# Patient Record
Sex: MALE | Race: WHITE | NOT HISPANIC OR LATINO | Employment: FULL TIME | URBAN - METROPOLITAN AREA
[De-identification: names, ages, dates, MRNs, and addresses within clinical notes are randomized per-mention and may not be internally consistent; named-entity substitution may affect disease eponyms.]

---

## 2017-02-14 DIAGNOSIS — E78.2 MIXED HYPERLIPIDEMIA: ICD-10-CM

## 2017-02-14 RX ORDER — ATORVASTATIN CALCIUM 20 MG/1
20 TABLET, FILM COATED ORAL DAILY
Qty: 90 TABLET | Refills: 0 | Status: SHIPPED | OUTPATIENT
Start: 2017-02-14 | End: 2017-05-19

## 2017-02-14 NOTE — TELEPHONE ENCOUNTER
Atorvastatin 20mg     Last Written Prescription Date: 2/9/16  Last Fill Quantity: 90, # refills: 3  Last Office Visit with G, P or [facility] prescribing provider: 10/24/16  Last date of pharmacy refill:  11/7/16       Lab Results   Component Value Date    CHOL 142 05/16/2016     Lab Results   Component Value Date    HDL 51 05/16/2016     Lab Results   Component Value Date    LDL 78 05/16/2016     Lab Results   Component Value Date    TRIG 63 05/16/2016     No results found for: MIKE

## 2017-05-19 DIAGNOSIS — E78.2 MIXED HYPERLIPIDEMIA: ICD-10-CM

## 2017-05-19 DIAGNOSIS — Z13.1 SCREENING FOR DIABETES MELLITUS: Primary | ICD-10-CM

## 2017-05-19 RX ORDER — ATORVASTATIN CALCIUM 20 MG/1
TABLET, FILM COATED ORAL
Qty: 30 TABLET | Refills: 0 | Status: SHIPPED | OUTPATIENT
Start: 2017-05-19 | End: 2017-06-26

## 2017-05-19 NOTE — TELEPHONE ENCOUNTER
Medication is being filled for 1 time refill only due to: Last lipid 5-16-16.  Patient needs labs fasting lipid.. Future labs ordered Lipid panel ordered. .  Routing to provider to review for any other labs needed.  Ashley

## 2017-05-19 NOTE — LETTER
[facility]  [address]  [place] [address]  [phone]        May 24, 2018    James Luis  [address] [address]              Dear James Luis    This is to remind you that your fasting lab is due.    You may call our office at [phone] to schedule an appointment.    Please disregard this notice if you have already had your labs drawn or made an appointment.        Sincerely,        Paula Fowler MD

## 2017-05-19 NOTE — TELEPHONE ENCOUNTER
atorvastatin (LIPITOR) 20 MG tablet     Last Written Prescription Date: 2/14/17  Last Fill Quantity: 90, # refills: 0  Last Office Visit with G, P or [facility] prescribing provider: 10/24/16       Lab Results   Component Value Date    CHOL 142 05/16/2016     Lab Results   Component Value Date    HDL 51 05/16/2016     Lab Results   Component Value Date    LDL 78 05/16/2016     Lab Results   Component Value Date    TRIG 63 05/16/2016     No results found for: CHOLEMMA

## 2017-06-15 ENCOUNTER — OFFICE VISIT - HEALTHEAST (OUTPATIENT)
Dept: FAMILY MEDICINE | Facility: CLINIC | Age: 47
End: 2017-06-15

## 2017-06-15 DIAGNOSIS — E78.00 HYPERCHOLESTEREMIA: ICD-10-CM

## 2017-06-15 DIAGNOSIS — Z00.00 ROUTINE GENERAL MEDICAL EXAMINATION AT A HEALTH CARE FACILITY: ICD-10-CM

## 2017-06-15 LAB
CHOLEST SERPL-MCNC: 140 MG/DL
FASTING STATUS PATIENT QL REPORTED: YES
HDLC SERPL-MCNC: 43 MG/DL
LDLC SERPL CALC-MCNC: 84 MG/DL
TRIGL SERPL-MCNC: 66 MG/DL

## 2017-06-15 ASSESSMENT — MIFFLIN-ST. JEOR: SCORE: 1403.98

## 2017-06-26 DIAGNOSIS — E78.2 MIXED HYPERLIPIDEMIA: ICD-10-CM

## 2017-06-26 NOTE — TELEPHONE ENCOUNTER
ATORVASTATIN 20 MG TABLET       Last Written Prescription Date: 5/19/17  Last Fill Quantity: 30, # refills: 0  Last Office Visit with [facility], P or [facility] prescribing provider: 10/24/16       Lab Results   Component Value Date    CHOL 142 05/16/2016     Lab Results   Component Value Date    HDL 51 05/16/2016     Lab Results   Component Value Date    LDL 78 05/16/2016     Lab Results   Component Value Date    TRIG 63 05/16/2016     No results found for: CHOLEMMA

## 2017-06-26 NOTE — LETTER
[facility]  [address]  [place] [facility] [address]  [phone]        July 2, 2018    James Luis  [address] [address]              Dear James Luis    This is to remind you that your lab is due.    You may call our office at [phone] to schedule an appointment.    Please disregard this notice if you have already had your labs drawn or made an appointment.        Sincerely,        Paula Fowler MD

## 2017-06-27 RX ORDER — ATORVASTATIN CALCIUM 20 MG/1
TABLET, FILM COATED ORAL
Qty: 30 TABLET | Refills: 0 | Status: SHIPPED | OUTPATIENT
Start: 2017-06-27 | End: 2017-08-02

## 2017-06-27 NOTE — TELEPHONE ENCOUNTER
Medication is being filled for 1 time refill only due to:  Patient needs labs lipids,cmp. Future labs ordered yes.   Omer Klein RN

## 2017-08-02 DIAGNOSIS — E78.2 MIXED HYPERLIPIDEMIA: ICD-10-CM

## 2017-08-02 NOTE — TELEPHONE ENCOUNTER
atorvastatin      Last Written Prescription Date: 6/27/17  Last Fill Quantity: 30, # refills: 0  Last Office Visit with [facility], P or [facility] prescribing provider: 10/24/16       Lab Results   Component Value Date    CHOL 142 05/16/2016     Lab Results   Component Value Date    HDL 51 05/16/2016     Lab Results   Component Value Date    LDL 78 05/16/2016     Lab Results   Component Value Date    TRIG 63 05/16/2016     No results found for: CHOLFUNMIO

## 2017-08-04 RX ORDER — ATORVASTATIN CALCIUM 20 MG/1
20 TABLET, FILM COATED ORAL DAILY
Qty: 30 TABLET | Refills: 0 | Status: SHIPPED | OUTPATIENT
Start: 2017-08-04

## 2017-08-04 NOTE — TELEPHONE ENCOUNTER
Medication is being filled for 1 time refill only due to:  Patient needs labs before refill.  Chapis Robles RN

## 2017-08-21 ENCOUNTER — COMMUNICATION - HEALTHEAST (OUTPATIENT)
Dept: FAMILY MEDICINE | Facility: CLINIC | Age: 47
End: 2017-08-21

## 2017-08-21 DIAGNOSIS — E78.5 HYPERLIPIDEMIA: ICD-10-CM

## 2017-12-21 ENCOUNTER — RECORDS - HEALTHEAST (OUTPATIENT)
Dept: GENERAL RADIOLOGY | Facility: CLINIC | Age: 47
End: 2017-12-21

## 2017-12-21 ENCOUNTER — OFFICE VISIT - HEALTHEAST (OUTPATIENT)
Dept: FAMILY MEDICINE | Facility: CLINIC | Age: 47
End: 2017-12-21

## 2017-12-21 DIAGNOSIS — R74.8 ELEVATED LIVER ENZYMES: ICD-10-CM

## 2017-12-21 DIAGNOSIS — M25.551 PAIN IN RIGHT HIP: ICD-10-CM

## 2017-12-21 DIAGNOSIS — M25.551 RIGHT HIP PAIN: ICD-10-CM

## 2017-12-21 ASSESSMENT — MIFFLIN-ST. JEOR: SCORE: 1431.19

## 2017-12-26 ENCOUNTER — COMMUNICATION - HEALTHEAST (OUTPATIENT)
Dept: FAMILY MEDICINE | Facility: CLINIC | Age: 47
End: 2017-12-26

## 2018-01-02 ENCOUNTER — RECORDS - HEALTHEAST (OUTPATIENT)
Dept: ADMINISTRATIVE | Facility: OTHER | Age: 48
End: 2018-01-02

## 2018-03-07 ENCOUNTER — COMMUNICATION - HEALTHEAST (OUTPATIENT)
Dept: FAMILY MEDICINE | Facility: CLINIC | Age: 48
End: 2018-03-07

## 2018-03-07 DIAGNOSIS — E78.5 HYPERLIPIDEMIA: ICD-10-CM

## 2018-03-07 DIAGNOSIS — R73.09 ELEVATED GLUCOSE: ICD-10-CM

## 2018-03-12 ENCOUNTER — AMBULATORY - HEALTHEAST (OUTPATIENT)
Dept: LAB | Facility: CLINIC | Age: 48
End: 2018-03-12

## 2018-03-12 DIAGNOSIS — R73.09 ELEVATED GLUCOSE: ICD-10-CM

## 2018-03-12 DIAGNOSIS — E78.5 HYPERLIPIDEMIA: ICD-10-CM

## 2018-03-12 LAB
ALBUMIN SERPL-MCNC: 3.8 G/DL (ref 3.5–5)
ALP SERPL-CCNC: 59 U/L (ref 45–120)
ALT SERPL W P-5'-P-CCNC: 25 U/L (ref 0–45)
ANION GAP SERPL CALCULATED.3IONS-SCNC: 7 MMOL/L (ref 5–18)
AST SERPL W P-5'-P-CCNC: 19 U/L (ref 0–40)
BILIRUB DIRECT SERPL-MCNC: 0.2 MG/DL
BILIRUB SERPL-MCNC: 0.5 MG/DL (ref 0–1)
BUN SERPL-MCNC: 21 MG/DL (ref 8–22)
CALCIUM SERPL-MCNC: 9.4 MG/DL (ref 8.5–10.5)
CHLORIDE BLD-SCNC: 106 MMOL/L (ref 98–107)
CHOLEST SERPL-MCNC: 147 MG/DL
CO2 SERPL-SCNC: 27 MMOL/L (ref 22–31)
CREAT SERPL-MCNC: 1.05 MG/DL (ref 0.7–1.3)
FASTING STATUS PATIENT QL REPORTED: YES
GFR SERPL CREATININE-BSD FRML MDRD: >60 ML/MIN/1.73M2
GLUCOSE BLD-MCNC: 92 MG/DL (ref 70–125)
HBA1C MFR BLD: 5.6 % (ref 3.5–6)
HDLC SERPL-MCNC: 41 MG/DL
LDLC SERPL CALC-MCNC: 85 MG/DL
POTASSIUM BLD-SCNC: 4.3 MMOL/L (ref 3.5–5)
PROT SERPL-MCNC: 6.6 G/DL (ref 6–8)
SODIUM SERPL-SCNC: 140 MMOL/L (ref 136–145)
TRIGL SERPL-MCNC: 106 MG/DL

## 2018-03-30 ENCOUNTER — OFFICE VISIT - HEALTHEAST (OUTPATIENT)
Dept: FAMILY MEDICINE | Facility: CLINIC | Age: 48
End: 2018-03-30

## 2018-03-30 DIAGNOSIS — J02.9 SORE THROAT: ICD-10-CM

## 2018-03-30 LAB — DEPRECATED S PYO AG THROAT QL EIA: NORMAL

## 2018-03-31 LAB — GROUP A STREP BY PCR: NORMAL

## 2018-04-02 ENCOUNTER — COMMUNICATION - HEALTHEAST (OUTPATIENT)
Dept: FAMILY MEDICINE | Facility: CLINIC | Age: 48
End: 2018-04-02

## 2018-04-02 ENCOUNTER — OFFICE VISIT - HEALTHEAST (OUTPATIENT)
Dept: FAMILY MEDICINE | Facility: CLINIC | Age: 48
End: 2018-04-02

## 2018-04-02 DIAGNOSIS — J02.9 SORE THROAT: ICD-10-CM

## 2018-04-02 DIAGNOSIS — R50.9 FEVER: ICD-10-CM

## 2018-04-02 DIAGNOSIS — R05.9 COUGH: ICD-10-CM

## 2018-04-02 LAB
FLUAV AG SPEC QL IA: NORMAL
FLUBV AG SPEC QL IA: NORMAL

## 2018-04-02 ASSESSMENT — MIFFLIN-ST. JEOR: SCORE: 1426.66

## 2018-04-05 ENCOUNTER — COMMUNICATION - HEALTHEAST (OUTPATIENT)
Dept: FAMILY MEDICINE | Facility: CLINIC | Age: 48
End: 2018-04-05

## 2018-04-05 DIAGNOSIS — R93.89 ABNORMAL X-RAY: ICD-10-CM

## 2018-04-09 ENCOUNTER — HOSPITAL ENCOUNTER (OUTPATIENT)
Dept: CT IMAGING | Facility: HOSPITAL | Age: 48
Discharge: HOME | End: 2018-04-09
Attending: FAMILY MEDICINE

## 2018-04-09 DIAGNOSIS — R93.89 ABNORMAL X-RAY: ICD-10-CM

## 2018-04-11 ENCOUNTER — AMBULATORY - HEALTHEAST (OUTPATIENT)
Dept: FAMILY MEDICINE | Facility: CLINIC | Age: 48
End: 2018-04-11

## 2018-04-12 ENCOUNTER — COMMUNICATION - HEALTHEAST (OUTPATIENT)
Dept: FAMILY MEDICINE | Facility: CLINIC | Age: 48
End: 2018-04-12

## 2018-04-18 ENCOUNTER — TELEPHONE (OUTPATIENT)
Dept: OTHER | Facility: CLINIC | Age: 48
End: 2018-04-18

## 2018-04-18 NOTE — TELEPHONE ENCOUNTER
4/18/2018    Call Regarding [employer]boarding [employer] Other    Attempt 1    Message on voicemail     Comments: spouse, 2 child dep      Outreach   FLOYD

## 2018-04-20 ENCOUNTER — OFFICE VISIT - HEALTHEAST (OUTPATIENT)
Dept: FAMILY MEDICINE | Facility: CLINIC | Age: 48
End: 2018-04-20

## 2018-04-20 DIAGNOSIS — R05.9 COUGH: ICD-10-CM

## 2018-04-20 DIAGNOSIS — Z87.891 FORMER TOBACCO USE: ICD-10-CM

## 2018-04-20 DIAGNOSIS — J18.9 PNEUMONIA: ICD-10-CM

## 2018-04-20 ASSESSMENT — MIFFLIN-ST. JEOR: SCORE: 1422.57

## 2018-04-23 LAB
QTF INTERPRETATION: NORMAL
QTF MITOGEN - NIL: >10 IU/ML
QTF NIL: 0.04 IU/ML
QTF RESULT: NEGATIVE
QTF TB ANTIGEN - NIL: 0 IU/ML

## 2018-05-23 NOTE — TELEPHONE ENCOUNTER
5/23/2018    Call Regarding Onboarding [employer] OTHER    Attempt 2    Message on voicemail     Comments: spouse, 2 child dep      Outreach   FLOYD

## 2018-06-06 NOTE — TELEPHONE ENCOUNTER
6/6/2018    Call Regarding Onboarding [employer] other    Attempt 3    Message on voicemail    Comments: 1 spouse, 2 child dep       Outreach   Caitlyn Pike

## 2018-06-29 ENCOUNTER — TELEPHONE (OUTPATIENT)
Dept: LAB | Facility: CLINIC | Age: 48
End: 2018-06-29

## 2018-08-07 ENCOUNTER — TELEPHONE (OUTPATIENT)
Dept: LAB | Facility: CLINIC | Age: 48
End: 2018-08-07

## 2018-08-11 ENCOUNTER — COMMUNICATION - HEALTHEAST (OUTPATIENT)
Dept: FAMILY MEDICINE | Facility: CLINIC | Age: 48
End: 2018-08-11

## 2018-08-11 DIAGNOSIS — E78.5 HYPERLIPIDEMIA: ICD-10-CM

## 2018-09-04 ENCOUNTER — COMMUNICATION - HEALTHEAST (OUTPATIENT)
Dept: FAMILY MEDICINE | Facility: CLINIC | Age: 48
End: 2018-09-04

## 2018-09-04 DIAGNOSIS — R93.89 ABNORMAL CT SCAN, CHEST: ICD-10-CM

## 2018-09-10 ENCOUNTER — HOSPITAL ENCOUNTER (OUTPATIENT)
Dept: CT IMAGING | Facility: HOSPITAL | Age: 48
Discharge: HOME | End: 2018-09-10
Attending: FAMILY MEDICINE

## 2018-09-10 DIAGNOSIS — R93.89 ABNORMAL CT SCAN, CHEST: ICD-10-CM

## 2018-09-11 ENCOUNTER — COMMUNICATION - HEALTHEAST (OUTPATIENT)
Dept: FAMILY MEDICINE | Facility: CLINIC | Age: 48
End: 2018-09-11

## 2019-01-15 ENCOUNTER — COMMUNICATION - HEALTHEAST (OUTPATIENT)
Dept: TELEHEALTH | Facility: CLINIC | Age: 49
End: 2019-01-15

## 2019-03-07 ENCOUNTER — OFFICE VISIT - HEALTHEAST (OUTPATIENT)
Dept: FAMILY MEDICINE | Facility: CLINIC | Age: 49
End: 2019-03-07

## 2019-03-07 DIAGNOSIS — Z12.11 SCREEN FOR COLON CANCER: ICD-10-CM

## 2019-03-07 DIAGNOSIS — Z79.899 MEDICATION MANAGEMENT: ICD-10-CM

## 2019-03-07 DIAGNOSIS — M54.50 ACUTE MIDLINE LOW BACK PAIN WITHOUT SCIATICA: ICD-10-CM

## 2019-03-07 DIAGNOSIS — E78.00 HYPERCHOLESTEREMIA: ICD-10-CM

## 2019-03-07 LAB
CHOLEST SERPL-MCNC: 141 MG/DL
FASTING STATUS PATIENT QL REPORTED: YES
HDLC SERPL-MCNC: 44 MG/DL
LDLC SERPL CALC-MCNC: 82 MG/DL
TRIGL SERPL-MCNC: 73 MG/DL

## 2019-03-14 ENCOUNTER — OFFICE VISIT - HEALTHEAST (OUTPATIENT)
Dept: FAMILY MEDICINE | Facility: CLINIC | Age: 49
End: 2019-03-14

## 2019-03-14 DIAGNOSIS — L03.011 FELON OF FINGER OF RIGHT HAND: ICD-10-CM

## 2019-03-18 ENCOUNTER — RECORDS - HEALTHEAST (OUTPATIENT)
Dept: ADMINISTRATIVE | Facility: OTHER | Age: 49
End: 2019-03-18

## 2019-03-18 ENCOUNTER — RECORDS - HEALTHEAST (OUTPATIENT)
Dept: LAB | Facility: CLINIC | Age: 49
End: 2019-03-18

## 2019-03-21 ENCOUNTER — RECORDS - HEALTHEAST (OUTPATIENT)
Dept: ADMINISTRATIVE | Facility: OTHER | Age: 49
End: 2019-03-21

## 2019-03-21 LAB
BACTERIA SPEC CULT: ABNORMAL
BACTERIA SPEC CULT: NORMAL
GRAM STAIN RESULT: ABNORMAL
GRAM STAIN RESULT: ABNORMAL

## 2019-05-14 ENCOUNTER — COMMUNICATION - HEALTHEAST (OUTPATIENT)
Dept: FAMILY MEDICINE | Facility: CLINIC | Age: 49
End: 2019-05-14

## 2019-05-14 DIAGNOSIS — E78.5 HYPERLIPIDEMIA: ICD-10-CM

## 2019-06-07 ENCOUNTER — OFFICE VISIT - HEALTHEAST (OUTPATIENT)
Dept: FAMILY MEDICINE | Facility: CLINIC | Age: 49
End: 2019-06-07

## 2019-06-07 DIAGNOSIS — J18.9 PNEUMONIA DUE TO INFECTIOUS ORGANISM, UNSPECIFIED LATERALITY, UNSPECIFIED PART OF LUNG: ICD-10-CM

## 2019-06-07 DIAGNOSIS — H91.90 HEARING LOSS, UNSPECIFIED HEARING LOSS TYPE, UNSPECIFIED LATERALITY: ICD-10-CM

## 2019-06-17 ENCOUNTER — RECORDS - HEALTHEAST (OUTPATIENT)
Dept: ADMINISTRATIVE | Facility: OTHER | Age: 49
End: 2019-06-17

## 2019-06-20 ENCOUNTER — OFFICE VISIT - HEALTHEAST (OUTPATIENT)
Dept: AUDIOLOGY | Facility: CLINIC | Age: 49
End: 2019-06-20

## 2019-06-20 DIAGNOSIS — H93.13 TINNITUS OF BOTH EARS: ICD-10-CM

## 2019-06-20 DIAGNOSIS — H90.41 SENSORINEURAL HEARING LOSS (SNHL) OF RIGHT EAR WITH UNRESTRICTED HEARING OF LEFT EAR: ICD-10-CM

## 2019-06-21 ENCOUNTER — COMMUNICATION - HEALTHEAST (OUTPATIENT)
Dept: PULMONOLOGY | Facility: OTHER | Age: 49
End: 2019-06-21

## 2019-08-19 ENCOUNTER — OFFICE VISIT - HEALTHEAST (OUTPATIENT)
Dept: PULMONOLOGY | Facility: OTHER | Age: 49
End: 2019-08-19

## 2019-08-19 DIAGNOSIS — J18.9 PNEUMONIA OF BOTH UPPER LOBES DUE TO INFECTIOUS ORGANISM: ICD-10-CM

## 2019-08-19 LAB
IGA SERPL-MCNC: 1031 MG/DL
IGA SERPL-MCNC: 245 MG/DL (ref 65–400)
IGM SERPL-MCNC: 116 MG/DL (ref 60–280)

## 2019-08-19 ASSESSMENT — MIFFLIN-ST. JEOR: SCORE: 1442.99

## 2019-09-04 ENCOUNTER — RECORDS - HEALTHEAST (OUTPATIENT)
Dept: PULMONOLOGY | Facility: OTHER | Age: 49
End: 2019-09-04

## 2019-09-04 ENCOUNTER — RECORDS - HEALTHEAST (OUTPATIENT)
Dept: ADMINISTRATIVE | Facility: OTHER | Age: 49
End: 2019-09-04

## 2019-09-04 DIAGNOSIS — J18.1 LOBAR PNEUMONIA, UNSPECIFIED ORGANISM (H): ICD-10-CM

## 2019-09-04 LAB — HGB BLD-MCNC: 13.6 G/DL

## 2019-10-07 ENCOUNTER — OFFICE VISIT - HEALTHEAST (OUTPATIENT)
Dept: PULMONOLOGY | Facility: OTHER | Age: 49
End: 2019-10-07

## 2019-10-07 DIAGNOSIS — J18.9 PNEUMONIA OF BOTH UPPER LOBES DUE TO INFECTIOUS ORGANISM: ICD-10-CM

## 2019-11-12 ENCOUNTER — OFFICE VISIT - HEALTHEAST (OUTPATIENT)
Dept: PULMONOLOGY | Facility: OTHER | Age: 49
End: 2019-11-12

## 2019-11-12 DIAGNOSIS — J98.01 BRONCHOSPASM: ICD-10-CM

## 2019-11-12 ASSESSMENT — MIFFLIN-ST. JEOR: SCORE: 1458.41

## 2019-11-23 ENCOUNTER — APPOINTMENT (OUTPATIENT)
Dept: GENERAL RADIOLOGY | Facility: CLINIC | Age: 49
End: 2019-11-23
Attending: PHYSICIAN ASSISTANT
Payer: COMMERCIAL

## 2019-11-23 ENCOUNTER — RECORDS - HEALTHEAST (OUTPATIENT)
Dept: ADMINISTRATIVE | Facility: OTHER | Age: 49
End: 2019-11-23

## 2019-11-23 ENCOUNTER — HOSPITAL ENCOUNTER (EMERGENCY)
Facility: CLINIC | Age: 49
Discharge: HOME | End: 2019-11-23
Attending: PHYSICIAN ASSISTANT | Admitting: PHYSICIAN ASSISTANT
Payer: COMMERCIAL

## 2019-11-23 VITALS
HEART RATE: 61 BPM | SYSTOLIC BLOOD PRESSURE: 136 MMHG | BODY MASS INDEX: 24.5 KG/M2 | OXYGEN SATURATION: 95 % | TEMPERATURE: 98.7 F | DIASTOLIC BLOOD PRESSURE: 81 MMHG | RESPIRATION RATE: 20 BRPM | WEIGHT: 145 LBS

## 2019-11-23 DIAGNOSIS — J98.01 BRONCHOSPASM: ICD-10-CM

## 2019-11-23 PROCEDURE — 71046 X-RAY EXAM CHEST 2 VIEWS: CPT

## 2019-11-23 PROCEDURE — G0463 HOSPITAL OUTPT CLINIC VISIT: HCPCS | Mod: 25 | Performed by: PHYSICIAN ASSISTANT

## 2019-11-23 PROCEDURE — 99214 OFFICE O/P EST MOD 30 MIN: CPT | Mod: Z6 | Performed by: PHYSICIAN ASSISTANT

## 2019-11-23 RX ORDER — ALBUTEROL SULFATE 0.83 MG/ML
2.5 SOLUTION RESPIRATORY (INHALATION) EVERY 4 HOURS PRN
Qty: 1 BOX | Refills: 0 | Status: SHIPPED | OUTPATIENT
Start: 2019-11-23 | End: 2019-12-23

## 2019-11-23 RX ORDER — PREDNISONE 20 MG/1
TABLET ORAL
Qty: 10 TABLET | Refills: 0 | Status: SHIPPED | OUTPATIENT
Start: 2019-11-23

## 2019-11-23 NOTE — ED PROVIDER NOTES
History     Chief Complaint   Patient presents with     Cough     x 6 weeks. dry     HPI  James Luis is a 49 year old male who presents to the urgent care with concern of a cough which is been present for the last 4 to 6 weeks.  Patient reports that h is got started shortly after he cleaned out the garage and some wood particles flew into his face, since then he complains of dry cough, shortness of breath, wheezing.  He believes that his symptoms were initially improving however the last several days have began to worsen and has had sore throat that he attributes to his cough.  He denies any fever, chills, myalgias.  He has not had any nausea, vomiting, diarrhea or abdominal pain.  He was evaluated by his pulmonologist several weeks ago and was told that symptoms are likely due to bronchospasm and would take up to another month to resolve.  No imaging was done at that time.  He reports that several months prior to that he was treated with antibiotics for pneumonia and did have confirmed clearance of infection by CT on 9/10/19.  He has been using albuterol inhaler and 2 puffs approximately every 6 hours however he for the last couple days he has increased to 3 puffs every 4.  He denies any prior history of asthma, COPD.  He is a former smoker who quit approximately 30 years ago.     Allergies:  No Known Allergies    Problem List:    Patient Active Problem List    Diagnosis Date Noted     Mixed hyperlipidemia 02/09/2016     Priority: Medium     CARDIOVASCULAR SCREENING; LDL GOAL LESS THAN 130 10/31/2010     Priority: Medium     Rosacea 04/12/2010     Priority: Medium      Past Medical History:    Past Medical History:   Diagnosis Date     NO ACTIVE PROBLEMS      Past Surgical History:    Past Surgical History:   Procedure Laterality Date     C ROOT CANAL THERAPY 2 CANALS  2016     Family History:    Family History   Problem Relation Age of Onset     Arthritis Mother      Coronary Artery Disease Father       Hypertension Father      Diabetes Father      Obesity Father      Other - See Comments Father         eye     Coronary Artery Disease Brother      Social History:  Marital Status:   [2]  Social History     Tobacco Use     Smoking status: Former Smoker     Smokeless tobacco: Former User     Tobacco comment: quit 1999   Substance Use Topics     Alcohol use: No     Alcohol/week: 0.0 standard drinks     Drug use: No        Medications:    aspirin 81 MG EC tablet  atorvastatin (LIPITOR) 20 MG tablet      Review of Systems  CONSTITUTIONAL:NEGATIVE for fever, chills, change in weight  INTEGUMENTARY/SKIN: NEGATIVE for worrisome rashes, moles or lesions  EYES: NEGATIVE for vision changes or irritation  ENT/MOUTH: POSITIVE for sore throat, minimal nasal congestion and NEGATIVE for ear pain   RESP:POSITIVE for cough, shortness of breath, wheezing   GI: NEGATIVE for nausea, vomiting, diarrhea, abdominal pain   Physical Exam   BP: 136/81  Pulse: 61  Temp: 98.7  F (37.1  C)  Resp: 20  Weight: 65.8 kg (145 lb)  SpO2: 95 %  Physical Exam  GENERAL APPEARANCE: healthy, alert and no distress  EYES: EOMI,  PERRL, conjunctiva clear  HENT: ear canals and TM's normal.  Nasal mucosa moist.  Posterior fossa is nonerythematous without exudate  NECK: supple, nontender, no lymphadenopathy  RESP: lungs clear to auscultation - no rales, rhonchi or wheezes, frequent cough with inhalation, exhalation  CV: regular rates and rhythm, normal S1 S2, no murmur noted  SKIN: no suspicious lesions or rashes  ED Course        Procedures        Critical Care time:  none        Results for orders placed or performed during the hospital encounter of 11/23/19 (from the past 24 hour(s))   Chest XR,  PA & LAT    Narrative    XR CHEST 2 VW   11/23/2019 3:01 PM     HISTORY:  cough      Impression    IMPRESSION:  Negative exam.    CARLTON POLLARD MD       Medications - No data to display    Assessments & Plan (with Medical Decision Making)     I have  reviewed the nursing notes.    I have reviewed the findings, diagnosis, plan and need for follow up with the patient.       Discharge Medication List as of 11/23/2019  3:42 PM      START taking these medications    Details   albuterol (PROVENTIL) (2.5 MG/3ML) 0.083% neb solution Take 1 vial (2.5 mg) by nebulization every 4 hours as needed for shortness of breath / dyspnea or wheezing, Disp-1 Box, R-0, E-Prescribe      predniSONE (DELTASONE) 20 MG tablet Take two tablets (= 40mg) each day for 5 (five) days, Disp-10 tablet, R-0, E-Prescribe      Respiratory Therapy Supplies (NEBULIZER) ARMANDO Take 1 Device by mouth once for 1 dose, Disp-1 each, R-0, E-PrescribeInclude tubing and mask for age please.             Final diagnoses:   Bronchospasm     49-year-old male presents to the urgent care concern over 4 to 6-week history of cough after he was sleeping in the garage and not avoid shavings which his face.  He had stable vital signs upon arrival.  Physical exam findings as described above included lungs which are clear to auscultation no wheezing, rales or rhonchi however there was a frequent cough with inhalation, exhalation.  Patient had chest x-ray which was negative for acute infiltrate, pneumothorax, pleural effusion or change in cardiopulmonary vasculature.  We agree with pulmonology reported that findings are likely secondary to bronchospasm from aerosolized particles.  Differential also include a viral bronchitis.  I do not suspect pertussis, undiagnosed asthma,  PE and will defer further evaluation.  He was discharged home stable with albuterol nebs as I think is really more effective as he coughs every time he attempts to take inhaler.  He was also given prescription for prednisone 40 mg daily for 5 days.  He was instructed to follow-up with primary care provider or his pulmonologist if no improvement within the next 3 to 5 days.  Worrisome reasons to return to the ER/UC sooner discussed.     Disclaimer: This  note consists of symbols derived from keyboarding, dictation, and/or voice recognition software. As a result, there may be errors in the script that have gone undetected.  Please consider this when interpreting information found in the chart.      11/23/2019   [facility] EMERGENCY DEPARTMENT     Etta Dawson PA-C  11/24/19 1142

## 2019-11-24 ENCOUNTER — RECORDS - HEALTHEAST (OUTPATIENT)
Dept: ADMINISTRATIVE | Facility: OTHER | Age: 49
End: 2019-11-24

## 2019-11-24 ENCOUNTER — COMMUNICATION - HEALTHEAST (OUTPATIENT)
Dept: PULMONOLOGY | Facility: OTHER | Age: 49
End: 2019-11-24

## 2019-11-29 ENCOUNTER — AMBULATORY - HEALTHEAST (OUTPATIENT)
Dept: PULMONOLOGY | Facility: OTHER | Age: 49
End: 2019-11-29

## 2019-11-29 ENCOUNTER — OFFICE VISIT - HEALTHEAST (OUTPATIENT)
Dept: FAMILY MEDICINE | Facility: CLINIC | Age: 49
End: 2019-11-29

## 2019-11-29 DIAGNOSIS — J98.01 BRONCHOSPASM: ICD-10-CM

## 2019-11-29 DIAGNOSIS — R05.9 COUGH: ICD-10-CM

## 2019-12-02 ENCOUNTER — OFFICE VISIT - HEALTHEAST (OUTPATIENT)
Dept: PULMONOLOGY | Facility: OTHER | Age: 49
End: 2019-12-02

## 2019-12-02 DIAGNOSIS — J40 BRONCHITIS: ICD-10-CM

## 2020-02-04 ENCOUNTER — COMMUNICATION - HEALTHEAST (OUTPATIENT)
Dept: FAMILY MEDICINE | Facility: CLINIC | Age: 50
End: 2020-02-04

## 2020-02-04 DIAGNOSIS — E78.5 HYPERLIPIDEMIA: ICD-10-CM

## 2020-03-01 ENCOUNTER — HEALTH MAINTENANCE LETTER (OUTPATIENT)
Age: 50
End: 2020-03-01

## 2020-03-27 ENCOUNTER — COMMUNICATION - HEALTHEAST (OUTPATIENT)
Dept: FAMILY MEDICINE | Facility: CLINIC | Age: 50
End: 2020-03-27

## 2020-09-12 ENCOUNTER — COMMUNICATION - HEALTHEAST (OUTPATIENT)
Dept: FAMILY MEDICINE | Facility: CLINIC | Age: 50
End: 2020-09-12

## 2020-09-12 DIAGNOSIS — E78.5 HYPERLIPIDEMIA: ICD-10-CM

## 2020-12-14 ENCOUNTER — HEALTH MAINTENANCE LETTER (OUTPATIENT)
Age: 50
End: 2020-12-14

## 2020-12-17 ENCOUNTER — COMMUNICATION - HEALTHEAST (OUTPATIENT)
Dept: FAMILY MEDICINE | Facility: CLINIC | Age: 50
End: 2020-12-17

## 2020-12-17 DIAGNOSIS — E78.5 HYPERLIPIDEMIA: ICD-10-CM

## 2021-01-05 ENCOUNTER — COMMUNICATION - HEALTHEAST (OUTPATIENT)
Dept: FAMILY MEDICINE | Facility: CLINIC | Age: 51
End: 2021-01-05

## 2021-01-05 DIAGNOSIS — E78.5 HYPERLIPIDEMIA: ICD-10-CM

## 2021-04-17 ENCOUNTER — HEALTH MAINTENANCE LETTER (OUTPATIENT)
Age: 51
End: 2021-04-17

## 2021-05-28 NOTE — TELEPHONE ENCOUNTER
Refill Approved    Rx renewed per Medication Renewal Policy. Medication was last renewed on 8/11/18.    Silvia Lara, [facility] Triage/Med Refill 5/14/2019     Requested Prescriptions   Pending Prescriptions Disp Refills     atorvastatin (LIPITOR) 20 MG tablet [Pharmacy Med Name: ATORVASTATIN 20 MG TABLET] 90 tablet 2     Sig: TAKE 1 TABLET (20 MG TOTAL) BY MOUTH DAILY.       Statins Refill Protocol (Hmg CoA Reductase Inhibitors) Passed - 5/14/2019  1:54 AM        Passed - PCP or prescribing provider visit in past 12 months      Last office visit with prescriber/PCP: 4/20/2018 Cecilio Redding MD OR same dept: 3/14/2019 Elisha Carmen DO OR same specialty: 3/14/2019 Elisha Carmen DO  Last physical: 6/15/2017 Last MTM visit: Visit date not found   Next visit within 3 mo: Visit date not found  Next physical within 3 mo: Visit date not found  Prescriber OR PCP: Cecilio Redding MD  Last diagnosis associated with med order: 1. Hyperlipidemia  - atorvastatin (LIPITOR) 20 MG tablet [Pharmacy Med Name: ATORVASTATIN 20 MG TABLET]; Take 1 tablet (20 mg total) by mouth daily.  Dispense: 90 tablet; Refill: 2    If protocol passes may refill for 12 months if within 3 months of last provider visit (or a total of 15 months).
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
## 2021-05-31 VITALS — HEIGHT: 65 IN | BODY MASS INDEX: 24.32 KG/M2 | WEIGHT: 146 LBS

## 2021-05-31 VITALS — WEIGHT: 140 LBS | BODY MASS INDEX: 23.32 KG/M2 | HEIGHT: 65 IN

## 2021-05-31 NOTE — PROGRESS NOTES
Assessment and Plan:James Luis is a 48 y.o. M with a past medical history significant for recurrent pneumonia who presents to clinic today for evaluation of his lung health. He has had about 4 pneumonias over the last 12 years, which is a little unusual.  He did have toxic exposures while working in the  [place] a [place] , however I'm not certain those could be responsible for this type of lung problem.  He has a normal CT at a baseline, and the one pneumonia I have an image of was bilateral, upper lobe patchy infiltrates.  I think it is reasonable to check his immunoglobulin levels, and also a pulmonary function test.  If both of those are normal, we can likely stop his workup with a good degree of certainty that he doesn't have any serious risk.  If he does develop another pneumonia, we could consider going straight to bronchoscopy to determine the cause.  Another test to consider would be to immunize against streptococcus, and then measure his antibody response with the specific panel to that.  A referral to Dr. Sage, an immunologist, may be appropriate for that workup.    1. Recurrent pneumonias - No baseline symptoms, and a normal baseline CT.  Check PFTs and measure IgA, IgM and IgG total levels to ensure no deficiencies.    2. RTC in 2-3 months to discuss the results      CCx:  pneumonia     HPI: Mr. Luis is a 48 year old male referred by Dr. Redding to discuss a history of recurrent pneumonias.  He describes having a pneumonia 4 times in the last 12 years or so.  His last pneuomonia was in the Spring of 2018.  He had a hacking cough and chills that got better with a Z pack.  He thinks the first pneumonia was the worst.  He is a  and [place] , and wants to know if his exposures while in the  could be to blame for this.  He has no baseline cough, or shortness of breath.  He ran three miles this morning and has no physical limitations in his current line  of work.  He is a former smoker but quit 19 years ago.  He also quit drinking over 20 years ago.  He has no significant medical problems.  He has occasional heart burn, and some seasonal allergies.  He does not get frequent infections like sinus infections or ear infections.  He has no family history of recurrent infections or lung disease.    PMH:  Past Medical History:   Diagnosis Date     Closed Fracture Of The Left Cuboid Bone     Created by Conversion        PSH:  No past surgical history on file.    SH:  Social History     Socioeconomic History     Marital status:      Spouse name: Not on file     Number of children: Not on file     Years of education: Not on file     Highest education level: Not on file   Occupational History     Not on file   Social Needs     Financial resource strain: Not on file     Food insecurity:     Worry: Not on file     Inability: Not on file     Transportation needs:     Medical: Not on file     Non-medical: Not on file   Tobacco Use     Smoking status: Former Smoker     Smokeless tobacco: Former User   Substance and Sexual Activity     Alcohol use: Not on file     Drug use: Not on file     Sexual activity: Not on file   Lifestyle     Physical activity:     Days per week: Not on file     Minutes per session: Not on file     Stress: Not on file   Relationships     Social connections:     Talks on phone: Not on file     Gets together: Not on file     Attends Christianity service: Not on file     Active member of club or organization: Not on file     Attends meetings of clubs or organizations: Not on file     Relationship status: Not on file     Intimate partner violence:     Fear of current or ex partner: Not on file     Emotionally abused: Not on file     Physically abused: Not on file     Forced sexual activity: Not on file   Other Topics Concern     Not on file   Social History Narrative     Not on file       Family history:  Family History   Problem Relation Age of Onset      Hyperlipidemia Mother      Acute Myocardial Infarction Father      Hypertension Father      Parkinsonism Father      Hyperlipidemia Brother      Alzheimer's disease Maternal Grandmother      Heart disease Maternal Grandfather      Acute Myocardial Infarction Maternal Grandfather      Heart disease Paternal Grandfather      Acute Myocardial Infarction Paternal Grandfather      Heart disease Brother      Acute Myocardial Infarction Brother      The family history was reviewed and is not pertinent to the chief complaint or HPI.    ROS:  Review of Systems - History obtained from the patient  General ROS: negative  Psychological ROS: negative  ENT ROS: negative  Allergy and Immunology ROS: negative  Endocrine ROS: negative  Respiratory ROS:  negative for - cough, hemoptysis, orthopnea, pleuritic pain, shortness of breath, sputum changes, stridor, tachypnea or wheezing  Cardiovascular ROS: no chest pain or palpitations  Gastrointestinal ROS: no abdominal pain, change in bowel habits, or black or bloody stools  Genito-Urinary ROS: no dysuria, trouble voiding, or hematuria  Musculoskeletal ROS: negative  Neurological ROS: no TIA or stroke symptoms  Dermatological ROS: negative      Current Meds:  Current Outpatient Medications   Medication Sig     aspirin 81 MG EC tablet Take 81 mg by mouth daily.     atorvastatin (LIPITOR) 20 MG tablet TAKE 1 TABLET (20 MG TOTAL) BY MOUTH DAILY.     cyclobenzaprine (FLEXERIL) 10 MG tablet Take 0.5 tablets (5 mg total) by mouth every 8 (eight) hours as needed for muscle spasms.       Labs:  No results found for this or any previous visit (from the past 72 hour(s)).    I have personally reviewed all imaging and PFT data available pertinent to this visit.    Imaging studies:  Ct Chest With Contrast    Result Date: 9/10/2018  CT CHEST W CONTRAST 9/10/2018 11:52 AM INDICATION: Shortness of breath, follow-up recommend from previous ct. Bilateral infiltrates.. TECHNIQUE: Routine chest. Dose  "reduction techniques were used. IV CONTRAST: omni 350 90ml COMPARISON: CT 04/09/2018 FINDINGS: LUNGS AND PLEURA: Resolution of the mild bilateral patchy pulmonary infiltrates since the prior study consistent with resolved infection. No new findings. MEDIASTINUM: Negative. No lymphadenopathy. LIMITED UPPER ABDOMEN: Negative. MUSCULOSKELETAL: Negative.     CONCLUSION: 1.  Resolved patchy bilateral pulmonary infiltrates consistent with resolved infection. 2.  No remaining or new findings.      PFTs:    none      Physical Exam:  /66   Pulse 60   Resp 24   Ht 5' 4.5\" (1.638 m)   Wt 148 lb 9.6 oz (67.4 kg)   SpO2 96% Comment: RA  BMI 25.11 kg/m    General - Well nourished  Ears/Mouth - OP pink moist, no thrush  Neck - no cervical lymphadenopathy  Lungs - Clear to ausculation bilaterally  CVS - regular rhythm with no murmurs, rubs or gallups  Abdomen - soft, NT, ND, NABS  Ext - no cyanosis, clubbing or edema  Skin - no rash  Psychology - alert and oriented, answers appropriate        Electronically signed by:    Johnie Faulkner MD PhD  [facility] Pulmonary and Critical Care Medicine  "

## 2021-05-31 NOTE — PATIENT INSTRUCTIONS - HE
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

## 2021-06-01 VITALS — WEIGHT: 144.1 LBS | HEIGHT: 65 IN | BODY MASS INDEX: 24.01 KG/M2

## 2021-06-01 VITALS — BODY MASS INDEX: 24.16 KG/M2 | HEIGHT: 65 IN | WEIGHT: 145 LBS

## 2021-06-01 VITALS — WEIGHT: 144 LBS | BODY MASS INDEX: 24.34 KG/M2

## 2021-06-02 VITALS — BODY MASS INDEX: 25.69 KG/M2 | WEIGHT: 152 LBS

## 2021-06-02 VITALS — BODY MASS INDEX: 25.52 KG/M2 | WEIGHT: 151 LBS

## 2021-06-02 NOTE — PROGRESS NOTES
Assessment and Plan:James Luis is a 48 y.o. M with a past medical history significant for recurrent pneumonias who presents to clinic today for follow up of his workup of recurring pneumonias.  His PFTs, CT, and Immunoglobulin levels are normal.  There does not appear to be any overt process that could explain these pneumonias, nor is there any evidence they have created any permanent damage to his lungs.  Other possibilities include gastric acid induced pneumonitis from silent reflux (although not typically upper lobe), or a more subtle immune deficiency that could explain an impaired response to infection.    1) Recurrent pnuemonias - his pulmonary workup is normal.  Refer to immunology to ensure he has a normal immune response to infection.  Consider immunoglobulin challenge.    2) If he has another pneumonia - we can consider a bronchoscopy at that time to determine a causative agent.    3) RTC prn           CCx:recurrent pneumonia    HPI: Mr. Luis is a 47 yo male with a history of recurrent pneumonias who returns for follow up.  Since his last visit he has had no new pneumonias and his health has been fine.  He has had PFTs, a CT, and baseline immunoglobulin levels checked.  His wife and he discussed his health, and she is still concerned there may be something wrong under the surface and wants to keep digging.      ROS:  Review of Systems - History obtained from the patient  General ROS: negative  Psychological ROS: negative  ENT ROS: negative  Allergy and Immunology ROS: negative  Endocrine ROS: negative  Respiratory ROS:   negative for - cough, orthopnea, shortness of breath or sputum changes  Cardiovascular ROS: no chest pain or palpitations  Gastrointestinal ROS: no abdominal pain, change in bowel habits, or black or bloody stools  Genito-Urinary ROS: no dysuria, trouble voiding, or hematuria  Musculoskeletal ROS: negative  Neurological ROS: no TIA or stroke symptoms  Dermatological ROS:  negative      Current Meds:  Current Outpatient Medications   Medication Sig     aspirin 81 MG EC tablet Take 81 mg by mouth daily.     atorvastatin (LIPITOR) 20 MG tablet TAKE 1 TABLET (20 MG TOTAL) BY MOUTH DAILY.     cyclobenzaprine (FLEXERIL) 10 MG tablet Take 0.5 tablets (5 mg total) by mouth every 8 (eight) hours as needed for muscle spasms.       Labs:  No results found for this or any previous visit (from the past 72 hour(s)).    I have personally reviewed all pertinent imaging studies and PFT results unless otherwise noted.    Imaging studies:  Ct Chest With Contrast    Result Date: 9/10/2018  CT CHEST W CONTRAST 9/10/2018 11:52 AM INDICATION: Shortness of breath, follow-up recommend from previous ct. Bilateral infiltrates.. TECHNIQUE: Routine chest. Dose reduction techniques were used. IV CONTRAST: omni 350 90ml COMPARISON: CT 04/09/2018 FINDINGS: LUNGS AND PLEURA: Resolution of the mild bilateral patchy pulmonary infiltrates since the prior study consistent with resolved infection. No new findings. MEDIASTINUM: Negative. No lymphadenopathy. LIMITED UPPER ABDOMEN: Negative. MUSCULOSKELETAL: Negative.     CONCLUSION: 1.  Resolved patchy bilateral pulmonary infiltrates consistent with resolved infection. 2.  No remaining or new findings.    PFT  FEV1/FVC is 0.83 and is normal.  FEV1 is 104% predicted and is normal.  FVC is 100% predicted and is normal.  There was no improvement in spirometry after a single inhaled dose of bronchodilator.  TLC is 94% predicted and is normal.  RV is 90% predicted and is normal.  DLCO is 107% predicted and is normal when it   is corrected for hemoglobin.  The flow volume loop is normal Yes.     Impression:  Full Pulmonary Function Test is normal.    Physical Exam:  /78   Pulse (!) 56   Resp 20   Wt 149 lb 12.8 oz (67.9 kg)   SpO2 98% Comment: RA  BMI 25.32 kg/m    General - Well nourished  Ears/Mouth -  OP pink moist, no thrush  Neck - no cervical  lymphadenopathy  Lungs - Clear to ausculation bilaterally   CVS - regular rhythm with no murmurs, rubs or gallups  Abdomen - soft, NT, ND, NABS  Ext - no cyanosis, clubbing or edema  Skin - no rash  Psychology - alert and oriented, answers appropriate        Electronically signed by:    Johnie Faulkner MD PhD  [facility] Pulmonary and Critical Care Medicine

## 2021-06-02 NOTE — PATIENT INSTRUCTIONS - HE
1) Your lung function is normal based on our lung function tests  2) Your lungs look OK on our CT  3) The immunoglobulin levels are also normal  4) If you want to dig further, I would meet with immunology to make sure your immune system is responding OK to the infection when it happens  5) If you get another pneumonia, we can work to do a further workup at that time. Give us a call then.

## 2021-06-03 VITALS
HEIGHT: 65 IN | RESPIRATION RATE: 12 BRPM | DIASTOLIC BLOOD PRESSURE: 60 MMHG | OXYGEN SATURATION: 100 % | WEIGHT: 152 LBS | SYSTOLIC BLOOD PRESSURE: 102 MMHG | BODY MASS INDEX: 25.33 KG/M2 | HEART RATE: 55 BPM

## 2021-06-03 VITALS
BODY MASS INDEX: 25.32 KG/M2 | HEART RATE: 56 BPM | WEIGHT: 149.8 LBS | SYSTOLIC BLOOD PRESSURE: 116 MMHG | OXYGEN SATURATION: 98 % | DIASTOLIC BLOOD PRESSURE: 78 MMHG | RESPIRATION RATE: 20 BRPM

## 2021-06-03 VITALS — HEIGHT: 65 IN | WEIGHT: 148.6 LBS | BODY MASS INDEX: 24.76 KG/M2

## 2021-06-03 VITALS — WEIGHT: 146.8 LBS | BODY MASS INDEX: 24.81 KG/M2

## 2021-06-03 NOTE — PROGRESS NOTES
Assessment and Plan:James Luis is a 49 y.o. M with a past medical history significant for recurrent pneumonias who presents to clinic today for bronchitis.  He has been to the urgent care twice for this current bought, and appears very susceptible to lung insults.  He may need a longer course of prednisone, but may also respond to the antinflammatory properties of azithromycin.  Codeine will help him get more sleep and numb his cough.  Finally, given the recurrent flares this winter, I would like to start him on Arnuity, an inhaled steroid, to try and reduce the number of exacerbations he is having.    1) Recurrent airways exacerbations - unclear if this is asthma or RADS.  Normal PFTs and immunoglobulin levels, could be related to the exposure of fumes in the [place], but impossible to prove.   Start Arnuity 100 daily inhaled, discussed side effects and encouraged to rinse mouth after.  Do this through the winter at the least to see if it can prevent recurrent flares  2) Current acute bronchitis - provide more prednisone, if his cough gets worse as he comes down on his taper, he needs to go back up to the prior level for 3 more days.  Continue albuterol as current.  Codeine AC guaf 5 ml at bedtime to help with cough prn.  Azithromycin 5 day course (zpak)  3) RTC in 3 months        CCx:cough    HPI: Mr. Luis is a 49 year old male who returns to discuss a  Bronchitis.  He developed a cough about a week after I saw him last, and the cough was very vigorous and takes his breath away with fatigue.  He went to urgent care, picked up a 5 day course of prednisone, and improved.  HOwever the day after he stopped the prednisone, he started coughing again, and ended back in urgent care.  He was given a higher dose and a taper to follow.  He is on prednisone 40mg again, and is coughing some, but doesn't feel as good as he did when he was on 60 mg daily.  He isn't sleeping well, and he notices if he sits or lays flat, his  cough is worse.  He has no fever or chills.  He is producing a brownish sputum.  He is using albuterol twice a day to good effect via a nebulizer.    ROS:  Review of Systems - History obtained from the patient  General ROS: negative  Psychological ROS: negative  ENT ROS: negative  Allergy and Immunology ROS: negative  Endocrine ROS: negative  Respiratory ROS: positive for - cough, shortness of breath, sputum changes and wheezing  negative for - hemoptysis or orthopnea  Cardiovascular ROS: no chest pain or palpitations  Gastrointestinal ROS: no abdominal pain, change in bowel habits, or black or bloody stools  Genito-Urinary ROS: no dysuria, trouble voiding, or hematuria  Musculoskeletal ROS: negative  Neurological ROS: no TIA or stroke symptoms  Dermatological ROS: negative      Current Meds:  Current Outpatient Medications   Medication Sig Note     albuterol (PROVENTIL) 2.5 mg /3 mL (0.083 %) nebulizer solution Take 3 mL (2.5 mg total) by nebulization every 6 (six) hours as needed for wheezing or shortness of breath.      albuterol sulfate 90 mcg/actuation AePB Inhale 180 mcg every 6 (six) hours as needed.      aspirin 81 MG EC tablet Take 81 mg by mouth daily.      atorvastatin (LIPITOR) 20 MG tablet TAKE 1 TABLET (20 MG TOTAL) BY MOUTH DAILY.      cyclobenzaprine (FLEXERIL) 10 MG tablet Take 0.5 tablets (5 mg total) by mouth every 8 (eight) hours as needed for muscle spasms.      INNOSPIRE ELEGANCE Lyly       predniSONE (DELTASONE) 20 MG tablet Take 60mg by mouth daily for 3 days, then 40mg x 3 days, then 20mg x 3 days ,then 10mg x 3 days then stop. 12/2/2019: He is currently at 40mg     azithromycin (ZITHROMAX) 250 MG tablet Take 1 tablet (250 mg total) by mouth daily for 5 days. Take 500 mg (2 x 250 mg tablets) on day 1 followed by 250 mg (1 tablet) on days 2-5.      codeine-guaiFENesin (GUAIFENESIN AC)  mg/5 mL liquid Take 5 mL by mouth 3 (three) times a day as needed for cough.      fluticasone furoate  (ARNUITY ELLIPTA) 100 mcg/actuation DsDv Inhale 100 mcg daily.      predniSONE (DELTASONE) 20 MG tablet Take 20 mg by mouth daily for 14 doses.        Labs:  No results found for this or any previous visit (from the past 72 hour(s)).    I have personally reviewed all pertinent imaging studies and PFT results unless otherwise noted.    Imaging studies:  Ct Chest With Contrast    Result Date: 9/10/2018  CT CHEST W CONTRAST 9/10/2018 11:52 AM INDICATION: Shortness of breath, follow-up recommend from previous ct. Bilateral infiltrates.. TECHNIQUE: Routine chest. Dose reduction techniques were used. IV CONTRAST: omni 350 90ml COMPARISON: CT 04/09/2018 FINDINGS: LUNGS AND PLEURA: Resolution of the mild bilateral patchy pulmonary infiltrates since the prior study consistent with resolved infection. No new findings. MEDIASTINUM: Negative. No lymphadenopathy. LIMITED UPPER ABDOMEN: Negative. MUSCULOSKELETAL: Negative.     CONCLUSION: 1.  Resolved patchy bilateral pulmonary infiltrates consistent with resolved infection. 2.  No remaining or new findings.        Physical Exam:  /82   Pulse 64   Resp 20   Wt 148 lb 11.2 oz (67.4 kg)   SpO2 95% Comment: RA  BMI 25.13 kg/m    General - Well nourished  Ears/Mouth -  OP pink moist, no thrush  Neck - no cervical lymphadenopathy  Lungs - scattered wheezes with slight rhonchi, clears with coughing  CVS - regular rhythm with no murmurs, rubs or gallups  Abdomen - soft, NT, ND, NABS  Ext - no cyanosis, clubbing or edema  Skin - no rash  Psychology - alert and oriented, answers appropriate        Electronically signed by:    Johnie Faulkner MD PhD  [facility] Pulmonary and Critical Care Medicine

## 2021-06-03 NOTE — PATIENT INSTRUCTIONS - HE
"1) You have a bronchitis based on your history, exam, and clear chest Xray  2) I would continue the prednisone as current, if the symptoms come back when you go down a level, go back up to the next level for 3 days and then try again  3) I gave you some codeine cough syrup to take at night  4) I also added a Z terrie to help cut down on the inflammation  5) I provided an Arnuity inhaled steroid to try to keep your lungs \"calm\" during the rest of the season.  You can start this after the prednisone taper.  "

## 2021-06-03 NOTE — PATIENT INSTRUCTIONS - HE
1) I will give you an albuterol inhaler to try an improve your cough as you are getting over this current spell  2) It doesn't surprise me that a face full of wood dust would make you cough a while.

## 2021-06-03 NOTE — PROGRESS NOTES
Assessment and Plan:James Luis is a 49 y.o.  Mwith a past medical history significant for recurrent pneumonia who presents to clinic today for a new cough.  He inhaled a face of saw dust and has been coughing since, although gradually getting worse.  I suspect this particulate inhalation has caused a bronchospastic episode and he might improve a little faster with albuterol.  I would not embark on any further workup or other treatments as he seems to be recovering without intervention from a clear provocation.    1) Cough - wood dust induced bronchospasm - particulate induced coughs could take a month to resolve as the body slowly clears the particulates from his lungs, like a smoker.  As needed albuterol, continue exercise as possible to improve mucociliary clearance.  2) RTC prn.           CCx: cough    HPI: Mr. Luis is a 49 year old male who returns on request of his wife to discuss a new cough.  A couple of weeks ago he was working in his wood shop and aerosolized a bunch of sawdust that he then accidentally inhaled.  He started coughing immediately and has been coughing since.  He is not producing mucous, and his cough is slowly getting better on its own.  He is not short of breath and still doing his daily runs in either his gym or outside.  He has no fevers.  The cough happens through the day, but he says it is distinctly different than when he caught pneumonia in the past.    ROS:  Review of Systems - History obtained from the patient  General ROS: negative  Psychological ROS: negative  ENT ROS: negative  Allergy and Immunology ROS: negative  Endocrine ROS: negative  Respiratory ROS: positive for - cough  negative for - hemoptysis, orthopnea, pleuritic pain, shortness of breath, sputum changes, stridor, tachypnea or wheezing  Cardiovascular ROS: no chest pain or palpitations  Gastrointestinal ROS: no abdominal pain, change in bowel habits, or black or bloody stools  Genito-Urinary ROS: no dysuria,  "trouble voiding, or hematuria  Musculoskeletal ROS: negative  Neurological ROS: no TIA or stroke symptoms  Dermatological ROS: negative      Current Meds:  Current Outpatient Medications   Medication Sig     aspirin 81 MG EC tablet Take 81 mg by mouth daily.     atorvastatin (LIPITOR) 20 MG tablet TAKE 1 TABLET (20 MG TOTAL) BY MOUTH DAILY.     cyclobenzaprine (FLEXERIL) 10 MG tablet Take 0.5 tablets (5 mg total) by mouth every 8 (eight) hours as needed for muscle spasms.     albuterol sulfate 90 mcg/actuation AePB Inhale 180 mcg every 6 (six) hours as needed.       Labs:  No results found for this or any previous visit (from the past 72 hour(s)).    I have personally reviewed all pertinent imaging studies and PFT results unless otherwise noted.    Imaging studies:  Ct Chest With Contrast    Result Date: 9/10/2018  CT CHEST W CONTRAST 9/10/2018 11:52 AM INDICATION: Shortness of breath, follow-up recommend from previous ct. Bilateral infiltrates.. TECHNIQUE: Routine chest. Dose reduction techniques were used. IV CONTRAST: omni 350 90ml COMPARISON: CT 04/09/2018 FINDINGS: LUNGS AND PLEURA: Resolution of the mild bilateral patchy pulmonary infiltrates since the prior study consistent with resolved infection. No new findings. MEDIASTINUM: Negative. No lymphadenopathy. LIMITED UPPER ABDOMEN: Negative. MUSCULOSKELETAL: Negative.     CONCLUSION: 1.  Resolved patchy bilateral pulmonary infiltrates consistent with resolved infection. 2.  No remaining or new findings.        Physical Exam:  /60   Pulse (!) 55   Resp 12   Ht 5' 4.5\" (1.638 m)   Wt 152 lb (68.9 kg)   SpO2 100%   BMI 25.69 kg/m    General - Well nourished  Ears/Mouth -  OP pink moist, no thrush  Neck - no cervical lymphadenopathy  Lungs - Clear to ausculation bilaterally   CVS - regular rhythm with no murmurs, rubs or gallups  Abdomen - soft, NT, ND, NABS  Ext - no cyanosis, clubbing or edema  Skin - no rash  Psychology - alert and oriented, answers " appropriate        Electronically signed by:    Johnie Faulkner MD PhD  [facility] Pulmonary and Critical Care Medicine

## 2021-06-03 NOTE — PROGRESS NOTES
Assessment:     1. Bronchospasm  predniSONE (DELTASONE) 20 MG tablet   2. Cough  XR Chest 2 Views    XR Chest 2 Views          Plan:     Patient continues with continued problems with likely bronchospasm as a result from inhaled sawdust a month ago.  Patient given prednisone again as this was helpful previously this time we will give him a burst with a longer taper.  Chest x-ray done today and is negative for infiltrate.  Will hold off on any antibiotics for now.  Recommend he continue with the albuterol nebs and inhaler that he has at home.  Recommend strongly that he keep his appointment with pulmonology on 12/2/2019.  Patient is agreeable with this plan.    Subjective:       49 y.o. male presents for evaluation of continued problems with cough and shortness of breath that he feels has been getting worse.  His symptoms initially started 1 month ago when he inhaled some sawdust.  Problems with cough and wheezing and has been seen numerous times for this including a visit with pulmonology.  He was initially given an albuterol inhaler as well as an albuterol nebulizer and steroid.  He was on the steroids for a 5-day burst and overall felt like he was getting better.  Chest x-ray had been done and the results of this was reviewed and was negative for pneumonia.  Over the past week or so however he feels like he has gotten worse.  He feels like someone is sitting on his chest and his cough has become more productive at times.  He feels more short of breath with movement as well as just sitting.  It is worse when he lays flat as well.  He has not had any fevers, chills, sweats nausea, vomiting, or chest pain his cough is been productive of some yellowish sputum but also coughed up large amount of what he thinks was some sawdust that was deep in his lung.  He feels like his breathing is overall tight.  He does have a follow-up appointment with pulmonology on 12/2/2019.  He did not feel that he could wait that long  however.    Office visits reviewed with the patient as well as consult with pulmonology.  Recent x-ray reviewed.      Patient Active Problem List   Diagnosis     Hypercholesteremia     Rosacea       Past Medical History:   Diagnosis Date     Closed Fracture Of The Left Cuboid Bone     Created by Conversion        No past surgical history on file.    Current Outpatient Medications on File Prior to Visit   Medication Sig Dispense Refill     albuterol sulfate 90 mcg/actuation AePB Inhale 180 mcg every 6 (six) hours as needed. 1 each 3     aspirin 81 MG EC tablet Take 81 mg by mouth daily.       atorvastatin (LIPITOR) 20 MG tablet TAKE 1 TABLET (20 MG TOTAL) BY MOUTH DAILY. 90 tablet 2     cyclobenzaprine (FLEXERIL) 10 MG tablet Take 0.5 tablets (5 mg total) by mouth every 8 (eight) hours as needed for muscle spasms. 30 tablet 1     INNOSPIRE ELEGANCE Lyly        No current facility-administered medications on file prior to visit.        No Known Allergies    Family History   Problem Relation Age of Onset     Hyperlipidemia Mother      Acute Myocardial Infarction Father      Hypertension Father      Parkinsonism Father      Hyperlipidemia Brother      Alzheimer's disease Maternal Grandmother      Heart disease Maternal Grandfather      Acute Myocardial Infarction Maternal Grandfather      Heart disease Paternal Grandfather      Acute Myocardial Infarction Paternal Grandfather      Heart disease Brother      Acute Myocardial Infarction Brother        Social History     Socioeconomic History     Marital status:      Spouse name: None     Number of children: None     Years of education: None     Highest education level: None   Occupational History     None   Social Needs     Financial resource strain: None     Food insecurity:     Worry: None     Inability: None     Transportation needs:     Medical: None     Non-medical: None   Tobacco Use     Smoking status: Former Smoker     Smokeless tobacco: Former User    Substance and Sexual Activity     Alcohol use: None     Drug use: None     Sexual activity: None   Lifestyle     Physical activity:     Days per week: None     Minutes per session: None     Stress: None   Relationships     Social connections:     Talks on phone: None     Gets together: None     Attends Buddhism service: None     Active member of club or organization: None     Attends meetings of clubs or organizations: None     Relationship status: None     Intimate partner violence:     Fear of current or ex partner: None     Emotionally abused: None     Physically abused: None     Forced sexual activity: None   Other Topics Concern     None   Social History Narrative     None         Review of Systems  A 12 point comprehensive review of systems was negative except as noted.      Objective:      /74   Pulse 71   Temp 98.1  F (36.7  C) (Oral)   Resp 18   Wt 147 lb 6.4 oz (66.9 kg)   SpO2 93%   BMI 24.91 kg/m    General appearance: alert, appears stated age and cooperative, mild dyspnea noted with movement.  Ill-appearing.  No distress.  Throat: lips, mucosa, and tongue normal; teeth and gums normal  Neck: no adenopathy and no JVD  Lungs: Scattered diffuse expiratory wheezing heard throughout his lung fields.  Heart: regular rate and rhythm, S1, S2 normal, no murmur, click, rub or gallop  Extremities: extremities normal, atraumatic, no cyanosis or edema  Skin: Skin color, texture, turgor normal. No rashes or lesions     Chest x-ray ordered and personally reviewed by myself.  No evidence of infiltrate, effusion, mass, or pneumothorax noted.    This note has been dictated using voice recognition software. Any grammatical or context distortions are unintentional and inherent to the software

## 2021-06-04 VITALS
WEIGHT: 147.4 LBS | BODY MASS INDEX: 24.91 KG/M2 | RESPIRATION RATE: 18 BRPM | OXYGEN SATURATION: 93 % | SYSTOLIC BLOOD PRESSURE: 136 MMHG | TEMPERATURE: 98.1 F | DIASTOLIC BLOOD PRESSURE: 74 MMHG | HEART RATE: 71 BPM

## 2021-06-04 VITALS
RESPIRATION RATE: 20 BRPM | HEART RATE: 64 BPM | BODY MASS INDEX: 25.13 KG/M2 | WEIGHT: 148.7 LBS | SYSTOLIC BLOOD PRESSURE: 128 MMHG | OXYGEN SATURATION: 95 % | DIASTOLIC BLOOD PRESSURE: 82 MMHG

## 2021-06-05 NOTE — TELEPHONE ENCOUNTER
Refill Approved    Rx renewed per Medication Renewal Policy. Medication was last renewed on 5/14/19.    Silvia Lara, [facility] Connection Triage/Med Refill 2/4/2020     Requested Prescriptions   Pending Prescriptions Disp Refills     atorvastatin (LIPITOR) 20 MG tablet [Pharmacy Med Name: ATORVASTATIN 20 MG TABLET] 90 tablet 2     Sig: TAKE 1 TABLET (20 MG TOTAL) BY MOUTH DAILY.       Statins Refill Protocol (Hmg CoA Reductase Inhibitors) Passed - 2/4/2020  2:22 AM        Passed - PCP or prescribing provider visit in past 12 months      Last office visit with prescriber/PCP: 6/7/2019 Cecilio Redding MD OR same dept: 6/7/2019 Cecilio Redding MD OR same specialty: 6/7/2019 Cecilio Redding MD  Last physical: 6/15/2017 Last MTM visit: Visit date not found   Next visit within 3 mo: Visit date not found  Next physical within 3 mo: Visit date not found  Prescriber OR PCP: Cecilio Redding MD  Last diagnosis associated with med order: 1. Hyperlipidemia  - atorvastatin (LIPITOR) 20 MG tablet [Pharmacy Med Name: ATORVASTATIN 20 MG TABLET]; TAKE 1 TABLET (20 MG TOTAL) BY MOUTH DAILY.  Dispense: 90 tablet; Refill: 2    If protocol passes may refill for 12 months if within 3 months of last provider visit (or a total of 15 months).

## 2021-06-11 NOTE — TELEPHONE ENCOUNTER
RN cannot approve Refill Request    RN can NOT refill this medication PCP messaged that patient is overdue for Office Visit. Last office visit: 6/7/2019 Cecilio Redding MD Last Physical: 6/15/2017 Last MTM visit: Visit date not found Last visit same specialty: 6/7/2019 Cecilio Redding MD.  Next visit within 3 mo: Visit date not found  Next physical within 3 mo: Visit date not found      Nell Simons, [facility] Connection Triage/Med Refill 9/13/2020    Requested Prescriptions   Pending Prescriptions Disp Refills     atorvastatin (LIPITOR) 20 MG tablet [Pharmacy Med Name: ATORVASTATIN 20 MG TABLET] 90 tablet 1     Sig: TAKE 1 TABLET BY MOUTH EVERY DAY       Statins Refill Protocol (Hmg CoA Reductase Inhibitors) Failed - 9/12/2020  9:46 AM        Failed - PCP or prescribing provider visit in past 12 months      Last office visit with prescriber/PCP: 6/7/2019 Cecilio Redding MD OR same dept: Visit date not found OR same specialty: 6/7/2019 Cecilio Redding MD  Last physical: 6/15/2017 Last MTM visit: Visit date not found   Next visit within 3 mo: Visit date not found  Next physical within 3 mo: Visit date not found  Prescriber OR PCP: Cecilio Redding MD  Last diagnosis associated with med order: 1. Hyperlipidemia  - atorvastatin (LIPITOR) 20 MG tablet [Pharmacy Med Name: ATORVASTATIN 20 MG TABLET]; TAKE 1 TABLET BY MOUTH EVERY DAY  Dispense: 90 tablet; Refill: 1    If protocol passes may refill for 12 months if within 3 months of last provider visit (or a total of 15 months).

## 2021-06-11 NOTE — PROGRESS NOTES
"Assessment/ Plan     1. Routine general medical examination at a health care facility    Recommend remaining physically active  Vaccines are up-to-date    2. Hypercholesteremia    Check a lipid cascade and hepatic profile  Continue atorvastatin  - Lipid Cascade  - Hepatic Profile  - Glucose      Subjective:       James Luis is a 46 y.o. male who presents for a complete physical examination.  His medical history is notable for hyperlipidemia.  He continues to take atorvastatin and has been compliant with his medication. He has followed up with cardiology and saw Dr. Cannon  given a family history of premature coronary artery disease.  He had at least one brother who  at age 36 of an acute myocardial infarction.  His father  at the age of 60 of an MI and a grandfather  in his 40s.  He did have a CT coronary calcium score that was 0.    He reports that he generally has been feeling well.  He has no specific concerns.  Review of systems is negative for headache, dizziness, chest pain, palpitations, or bowel changes.  He does not have any urinary concerns.    The following portions of the patient's history were reviewed and updated as appropriate: allergies, current medications, past family history, past medical history, past social history, past surgical history and problem list.    Review of Systems   A 12 point comprehensive review of systems was negative except as noted.      Current Outpatient Prescriptions   Medication Sig Dispense Refill     aspirin 81 MG EC tablet Take 81 mg by mouth daily.       atorvastatin (LIPITOR) 20 MG tablet Take 20 mg by mouth daily.       No current facility-administered medications for this visit.        Objective:      /68  Pulse 60  Temp 98.5  F (36.9  C) (Oral)   Resp 16  Ht 5' 4.5\" (1.638 m)  Wt 140 lb (63.5 kg)  BMI 23.66 kg/m2      General appearance: alert, appears stated age and cooperative  Head: Normocephalic, without obvious abnormality, " atraumatic  Eyes: conjunctivae/corneas clear. PERRL, EOM's intact. Fundi benign.  Ears: normal TM's and external ear canals both ears  Nose: Nares normal. Septum midline. Mucosa normal. No drainage or sinus tenderness.  Throat: lips, mucosa, and tongue normal; teeth and gums normal  Neck: no adenopathy, no carotid bruit, no JVD, supple, symmetrical, trachea midline and thyroid not enlarged, symmetric, no tenderness/mass/nodules  Back: symmetric, no curvature. ROM normal. No CVA tenderness.  Lungs: clear to auscultation bilaterally  Heart: regular rate and rhythm, S1, S2 normal, no murmur, click, rub or gallop  Abdomen: soft, non-tender; bowel sounds normal; no masses,  no organomegaly  Extremities: extremities normal, atraumatic, no cyanosis or edema  Pulses: 2+ and symmetric  Skin: Skin color, texture, turgor normal. No rashes or lesions  Lymph nodes: Cervical, supraclavicular, and axillary nodes normal.  Neurologic: Alert and oriented X 3, normal strength and tone. Normal symmetric reflexes. Normal coordination and gait         No results found for this or any previous visit (from the past 168 hour(s)).       This note has been dictated using voice recognition software. Any grammatical or context distortions are unintentional and inherent to the software

## 2021-06-13 NOTE — TELEPHONE ENCOUNTER
RN cannot approve Refill Request    RN can NOT refill this medication PCP messaged that patient is overdue for Office Visit. Last office visit: 6/7/2019 Cecilio Redding MD Last Physical: 6/15/2017 Last MTM visit: Visit date not found Last visit same specialty: 6/7/2019 Cecilio Redding MD.  Next visit within 3 mo: Visit date not found  Next physical within 3 mo: Visit date not found      Nell Simons, [facility] Triage/Med Refill 12/19/2020    Requested Prescriptions   Pending Prescriptions Disp Refills     atorvastatin (LIPITOR) 20 MG tablet [Pharmacy Med Name: ATORVASTATIN 20 MG TABLET] 90 tablet 0     Sig: TAKE 1 TABLET BY MOUTH EVERY DAY       Statins Refill Protocol (Hmg CoA Reductase Inhibitors) Failed - 12/17/2020  4:21 AM        Failed - PCP or prescribing provider visit in past 12 months      Last office visit with prescriber/PCP: 6/7/2019 Cecilio Redding MD OR same dept: Visit date not found OR same specialty: 6/7/2019 Cecilio Redding MD  Last physical: 6/15/2017 Last MTM visit: Visit date not found   Next visit within 3 mo: Visit date not found  Next physical within 3 mo: Visit date not found  Prescriber OR PCP: Cecilio Redding MD  Last diagnosis associated with med order: 1. Hyperlipidemia  - atorvastatin (LIPITOR) 20 MG tablet [Pharmacy Med Name: ATORVASTATIN 20 MG TABLET]; TAKE 1 TABLET BY MOUTH EVERY DAY  Dispense: 90 tablet; Refill: 0    If protocol passes may refill for 12 months if within 3 months of last provider visit (or a total of 15 months).

## 2021-06-14 NOTE — TELEPHONE ENCOUNTER
I spoke with patient, [facility] I let him know to please let pharmacy know who his new provider is when requesting refills.

## 2021-06-14 NOTE — TELEPHONE ENCOUNTER
RN cannot approve Refill Request    RN can NOT refill this medication Protocol failed and NO refill given. Last office visit: 6/7/2019 Cecilio Redding MD Last Physical: 6/15/2017 Last MTM visit: Visit date not found Last visit same specialty: 6/7/2019 Cecilio Redding MD.  Next visit within 3 mo: Visit date not found  Next physical within 3 mo: Visit date not found      Silvia Lara, [facility] Triage/Med Refill 1/6/2021    Requested Prescriptions   Pending Prescriptions Disp Refills     atorvastatin (LIPITOR) 20 MG tablet 30 tablet 0     Sig: Take one PO Qday. An appointment is due       Statins Refill Protocol (Hmg CoA Reductase Inhibitors) Failed - 1/5/2021  3:03 PM        Failed - PCP or prescribing provider visit in past 12 months      Last office visit with prescriber/PCP: 6/7/2019 Cecilio Redding MD OR same dept: Visit date not found OR same specialty: 6/7/2019 Cecilio Redding MD  Last physical: 6/15/2017 Last MTM visit: Visit date not found   Next visit within 3 mo: Visit date not found  Next physical within 3 mo: Visit date not found  Prescriber OR PCP: Cecilio Redding MD  Last diagnosis associated with med order: 1. Hyperlipidemia  - atorvastatin (LIPITOR) 20 MG tablet; Take one PO Qday. An appointment is due  Dispense: 30 tablet; Refill: 0    If protocol passes may refill for 12 months if within 3 months of last provider visit (or a total of 15 months).

## 2021-06-14 NOTE — TELEPHONE ENCOUNTER
Please call. I refilled his atorvastatin but he is due for a visit with me. He is overdue for a cholesterol check.

## 2021-06-15 NOTE — PROGRESS NOTES
Assessment/ Plan     1. Right hip pain  Consider possible tendinitis.   Osteoarthritis less likely  Consider possible trochanteric bursitis    X-rays of the right hip are negative for significant joint space narrowing or other specific abnormality  X-rays were personally reviewed by me will be reviewed by radiology    - XR Hip Right 2 or More VWS; Future  - Ambulatory referral to Orthopedics    Discussed options including referral for physical therapy  Given his ongoing discomfort he will be referred to orthopedics further evaluation and treatment recommendations    2. Elevated liver enzymes    We will recheck liver enzymes at this time  - Hepatic Profile  - GGT (Gamma GT)  Consider further evaluation is warranted.  Would consider an ultrasound of the liver if needed      25 minutes were spent with the patient and greater than 50% of the time was spent in face to face counseling and coordination of care      Subjective:       James Luis is a 47 y.o. male who presents to the clinic as he has had a recent history of right hip pain.  He has noted that he has had pain involving the lateral and posterior aspect of his right hip.  He cannot think of any specific injury.  This been going on for the past 6 months.  He does work as a  and does wear a holster which can be heavy.  He has recently noted that it is hard to stand up without significant pain.  Over the past 3 weeks this has been more of a constant type of pain.  There is no significant low back pain.  There is no radiation down his right leg.  He has been taking Advil.  Next, he recently had laboratory testing and his liver enzymes were abnormal.  His AST was 194 and ALT was 100.  He denies a significant amount of alcohol intake.  He does take atorvastatin for hyperlipidemia.  He denies obvious jaundice or other significant concerns.    The following portions of the patient's history were reviewed and updated as appropriate: allergies, current  "medications, past family history, past medical history, past social history, past surgical history and problem list.    Review of Systems   A 12 point comprehensive review of systems was negative except as noted.      Current Outpatient Prescriptions   Medication Sig Dispense Refill     aspirin 81 MG EC tablet Take 81 mg by mouth daily.       atorvastatin (LIPITOR) 20 MG tablet Take 1 tablet (20 mg total) by mouth daily. 90 tablet 3     No current facility-administered medications for this visit.        Objective:      /72  Pulse 64  Temp 97.5  F (36.4  C) (Oral)   Resp 16  Ht 5' 4.5\" (1.638 m)  Wt 146 lb (66.2 kg)  BMI 24.67 kg/m2      General appearance: alert, appears stated age and cooperative  Head: Normocephalic, without obvious abnormality, atraumatic  Eyes: conjunctivae/corneas clear. PERRL, EOM's intact  There is no scleral icterus  Ears: normal TM's and external ear canals both ears  Nose: Nares normal. Septum midline. Mucosa normal. No drainage or sinus tenderness.  Back: There is no tenderness over the lumbar spine or the paraspinous muscles  Straight leg raise is negative on the right  Extremities: extremities normal, atraumatic, no cyanosis or edema  There is some tenderness to palpation of the lateral right hip area  He has normal range of motion with internal and external rotation  Skin: Skin color, texture, turgor normal. No rashes or lesions  Lymph nodes: Cervical nodes normal.  Neurologic: Alert and oriented X 3. Normal coordination and gait     Radiology:    XR Hip Right 2 or More VWS (Order [id number])   Imaging   Date: 12/21/2017 Department: [facility] Released By: RT Lam (R) Authorizing: Cecilio Redding MD   Study Result   XR HIP RIGHT 2 OR MORE VWS  12/21/2017 12:40 PM     INDICATION: right hip pain, 8 months  COMPARISON: None.     FINDINGS: Negative hip. No fracture or dislocation.          No results found for this or any previous visit (from the past 168 " hour(s)).       This note has been dictated using voice recognition software. Any grammatical or context distortions are unintentional and inherent to the software

## 2021-06-17 NOTE — PROGRESS NOTES
Assessment/ Plan     1. Cough  2. Pneumonia, bilateral    Reviewed the recent CT scan of the chest showing bilateral opacities in the upper lung lobes consistent with a possible atypical pneumonia or inflammation  He has been improving with azithromycin  Check a TB test  - QTF-Mycobacterium tuberculosis by QuantiFERON-TB Gold  Recommend that he monitor for ongoing improvement  Recommend a recheck of a CT scan of the chest in 3 months  Consider further evaluation including pulmonary function testing or possible referral to pulmonology if appropriate    3. Former tobacco use    Reviewed his previous tobacco history including a 13-pack-year history of use  Review the CT scan as noted  Consider further evaluation as appropriate    25 minutes were spent with the patient and greater than 50% of the time was spent in face to face counseling and coordination of care      Subjective:       James Luis is a 47 y.o. male who presents to the clinic in follow-up.  He most recently had an abnormal CT scan of the chest showing bilateral opacities in the upper lung lobes which may be consistent with pneumonia or inflammation.  As result, he was started on azithromycin and advised to follow-up in the clinic for a TB test based on the appearance of the CT scan.  He reports he has taken azithromycin and is feeling better.  He did have a harsh cough.  He is still coughing but is gradually improving.  As noted, he previously had developed a sore throat with some difficulty swallowing.  He had some neck tenderness and had an evaluation including a negative strep test.  He was given a prescription for penicillin at the time.  Seen in follow-up his cough was worsening and becoming more productive.  He experienced coughing jags and felt mildly short of breath.  He had a chest x-ray showing an indeterminate nodule in the right anterior chest.  He therefore was referred for a CT scan which was abnormal as noted.  He is a former tobacco  "user.  He states that for 3 or 4 years he smoked about 2 packs per day of cigarettes.  Then, for the following 10 years he smoked about a half a pack a day.      The following portions of the patient's history were reviewed and updated as appropriate: allergies, current medications, past family history, past medical history, past social history, past surgical history and problem list.    Review of Systems   A 12 point comprehensive review of systems was negative except as noted.      Current Outpatient Prescriptions   Medication Sig Dispense Refill     albuterol (PROAIR HFA;PROVENTIL HFA;VENTOLIN HFA) 90 mcg/actuation inhaler Inhale 2 puffs every 6 (six) hours as needed for wheezing. 1 each 0     aspirin 81 MG EC tablet Take 81 mg by mouth daily.       atorvastatin (LIPITOR) 20 MG tablet Take 1 tablet (20 mg total) by mouth daily. 90 tablet 3     azithromycin (ZITHROMAX Z-GIL) 250 MG tablet Take 2 tablets (500 mg) on  Day 1,  followed by 1 tablet (250 mg) once daily on Days 2 through 5. 6 tablet 0     benzonatate (TESSALON) 200 MG capsule Take 1 capsule (200 mg total) by mouth 3 (three) times a day as needed for cough. 20 capsule 0     predniSONE (DELTASONE) 20 MG tablet Take one PO BID x 5 days 10 tablet 0     No current facility-administered medications for this visit.        Objective:      /62  Pulse 60  Temp 98  F (36.7  C) (Oral)   Resp 12  Ht 5' 4.5\" (1.638 m)  Wt 144 lb 1.6 oz (65.4 kg)  BMI 24.35 kg/m2      General appearance: alert, appears stated age and cooperative  Head: Normocephalic, without obvious abnormality, atraumatic  Eyes: conjunctivae/corneas clear. PERRL, EOM's intact.   Ears: normal TM's and external ear canals both ears  Nose: Nares normal. Septum midline. Mucosa normal. No drainage or sinus tenderness.  Throat: lips, mucosa, and tongue normal; teeth and gums normal  Neck: no adenopathy, supple, symmetrical, trachea midline   Lungs: clear to auscultation bilaterally  Heart: " regular rate and rhythm, S1, S2 normal, no murmur, click, rub or gallop  Extremities: extremities normal, atraumatic, no cyanosis or edema  Skin: Skin color, texture, turgor normal. No rashes or lesions  Lymph nodes: Cervical nodes normal.  Neurologic: Alert and oriented X 3. Normal coordination and gait     Radiology:    XR Chest 2 Views (Order [id number])   Imaging   Date: 4/2/2018 Department: [facility] Ordering/Authorizing: Cecilio Redding MD   Study Result   XR CHEST 2 VIEWS  4/2/2018 11:42 AM     INDICATION: Ongoing cough, h/o pneumonia  COMPARISON: None.     FINDINGS: Small nodular density between the right anterior third and fourth ribs is indeterminant. Consider CT to further evaluate. Otherwise negative chest.     CT Chest With Contrast (Order [id number])   Imaging   Date: 4/9/2018 Department: [facility] CT Released By: Vida Galloway, RT (R) Authorizing: Cecilio Redding MD   Study Result   CT CHEST W CONTRAST  4/9/2018 7:05 PM     INDICATION: Acute respiratory illness, >40 years old. Abnormal chest x-ray. Pulmonary nodule.  TECHNIQUE: Routine chest. Dose reduction techniques were used.  IV CONTRAST: Iohexol (Omni) 90mL.  COMPARISON: Chest radiograph 4/2/2018.     FINDINGS:  LUNGS AND PLEURA: Ill-defined opacities and groundglass with upper lobe predominance and centrilobular component of distribution. To a lesser degree involving the lingula and lower lobes. No pleural effusion or pneumothorax.     MEDIASTINUM: No adenopathy. Normal heart size. No pericardial effusion. Normal caliber aorta and pulmonary trunk.     LIMITED UPPER ABDOMEN: Negative.     MUSCULOSKELETAL: Negative.     IMPRESSION:   CONCLUSION:     1.  Mild ill-defined opacities bilaterally with upper lobe predominance compatible with infection / inflammation. Atypical infection is possible given upper lobe distribution (including tuberculosis). Recommend 3 month follow-up for resolution.             No results found for this or any previous visit (from the past 168 hour(s)).       This note has been dictated using voice recognition software. Any grammatical or context distortions are unintentional and inherent to the software

## 2021-06-17 NOTE — PATIENT INSTRUCTIONS - HE
Patient Instructions by Elisha Carmen DO at 3/14/2019  2:40 PM     Author: Elisha Carmen DO Service: -- Author Type: Physician    Filed: 3/14/2019  3:00 PM Encounter Date: 3/14/2019 Status: Addendum    : Elisha Carmen DO (Physician)    Related Notes: Original Note by Elisha Carmen DO (Physician) filed at 3/14/2019  3:00 PM         Patient Education     Paronychia of the Finger or Toe  Paronychia is an infection near a fingernail or toenail. It usually occurs when an opening in the cuticle or an ingrown toenail lets bacteria under the skin.  The infection will need to be drained if pus is present. If the infection has been caught early, you may need only antibiotic treatment. Healing will take about 1 to 2 weeks.  Home care  Follow these guidelines when caring for yourself at home:    Clean and soak the toe or finger. Do this 2 times a day for the first 3 days. To do so:  ? Soak your foot or hand in a tub of warm water for 5 minutes. Or hold your toe or finger under a faucet of warm running water for 5 minutes.  ? Clean any crust away with soap and water using a cotton swab.  ? Put antibiotic ointment on the infected area.    Change the dressing daily or any time it gets dirty.    If you were given antibiotics, take them as directed until they are all gone.    If your infection is on a toe, wear comfortable shoes with a lot of toe room. You can also wear open-toed sandals while your toe heals.    You may use over-the-counter medicine (acetaminophen or ibuprofen to help with pain, unless another medicine was prescribed. If you have chronic liver or kidney disease, talk with your healthcare provider before using these medicines. Also talk with your provider if you've had a stomach ulcer or GI (gastrointestinal) bleeding.  Prevention  The following can prevent paronychia:    Avoid cutting or playing with your cuticles at home.    Don't bite your nails.    Don't suck on your thumbs or fingers.  Follow-up  care  Follow up with your healthcare provider, or as advised.  When to seek medical advice  Call your healthcare provider right away if any of these occur:    Redness, pain, or swelling of the finger or toe gets worse    Red streaks in the skin leading away from the wound    Pus or fluid draining from the nail area    Fever of 100.4 F (38 C) or higher, or as directed by your provider  Date Last Reviewed: 8/1/2016 2000-2017 [email or web address]. [address] [place] [address]. All rights reserved. This information is not intended as a substitute for professional medical care. Always follow your healthcare professional's instructions.

## 2021-06-17 NOTE — PROGRESS NOTES
Assessment/Plan:    1. Sore throat  Rapid Strep A Screen-Throat    Group A Strep, RNA Direct Detection, Throat      - Centor score: 3/4. Rapid strep was negative.  The patient almost meets criteria on all accounts.  However, given his age his Centor score ultimately is a 2-3/4.  I suspect the rapid strep is actually a false negative.  Advised the patient to evaluate his symptoms over the next 24-48 hours.  If he has a measured fever, worsening sore throat without corresponding cough or sinus congestion he should fill the antibiotics initiate treatment for group A strep.  Follow-up as needed.    Medications Ordered   Medications     penicillin VK (PEN VK) 500 MG tablet     Sig: Take 1 tablet (500 mg total) by mouth 2 (two) times a day for 10 days.     Dispense:  20 tablet     Refill:  0     Orders Placed This Encounter   Procedures     Rapid Strep A Screen-Throat     Group A Strep, RNA Direct Detection, Throat     Noman Costa MD  _______________________________    Chief Complaint   Patient presents with     Sore Throat     x 3 days      Cough     Subjective: James Luis is a 47 y.o. year old male who I have not seen in clinic before who presents with the following acute complaint(s):    Sore throat:   - duration: 3 days   - URI symptoms: mild cough   - fever > 100.4: No: 99.8   - recent infections/contacts: No   - cough: No   - palliative measure: ibuprofen helps 800 mg at a time.   - No pertinent additional PMH   - hard to swollow    ROS: Complete review of systems obtained.  Pertinent items are listed above.     The following portions of the patient's history were reviewed and updated as appropriate: allergies, current medications, past medical history and problem list.     Objective:   /68 (Patient Site: Left Arm, Patient Position: Sitting, Cuff Size: Adult Regular)  Pulse 68  Temp 98.6  F (37  C) (Oral)   Resp 22  Wt 144 lb (65.3 kg)  SpO2 98% Comment: room air  BMI 24.34 kg/m2  General: No  acute distress  Eyes: No conjunctival injection, no scleral icterus.  ENT: The tympanic membranes bilaterally gray and glistening.  There is tonsillar exudate/tonsillar enlargement.  There is submandibular anterior cervical lymphadenopathy.   Cardiac: Regular rate rhythm, normal S1/S2, no murmurs or gallops  Respiratory: Clear to auscultation bilaterally.  Skin: No rashes or lesions  Psych: Normal affect  Neurologic: Cranial nerves II through XII grossly intact.  Able to ambulate normally.    Recent Results (from the past 24 hour(s))   Rapid Strep A Screen-Throat   Result Value Ref Range    Rapid Strep A Antigen No Group A Strep detected, presumptive negative No Group A Strep detected, presumptive negative     No results found.    Additional History from Old Records Summarized (2): no  Decision to Obtain Records (1): no  Radiology Tests Summarized or Ordered (1): no  Labs Reviewed or Ordered (1): yes  Medicine Test Summarized or Ordered (1): no  Independent Review of EKG or X-RAY(2 each): no    This note has been dictated using voice recognition software. Any grammatical or context distortions are unintentional and inherent to the software

## 2021-06-17 NOTE — PROGRESS NOTES
Assessment/ Plan     1. Cough  2. Sore throat  3. Fever    A chest x-ray is negative for acute infiltrate  Chest x-ray was personally reviewed by me and will be reviewed by radiology as well    An influenza test is negative  - Influenza A/B Rapid Test  - XR Chest 2 Views    He was previously started on penicillin for presumed strep pharyngitis  At this point recommend that he continue the full course of penicillin    Recommend symptomatic treatment with fluids and rest  Prescribed Tessalon Perles to take as needed  If symptoms worsen consider switching to azithromycin which would more effectively cover any typical respiratory infection given his history of previous pneumonia  Recommend fluids and rest    Patient agrees to plan    25 minutes were spent with the patient and greater than 50% of the time was spent in face to face counseling and coordination of care        Subjective:       James Luis is a 47 y.o. male who presents to the clinic in follow-up for ongoing constellation of symptoms.  Specifically, he was seen at the [facility] 3 days ago with a 3 or 4 day history of a sore throat.  He developed a sore throat and had some difficulty swallowing.  He had some tenderness in the anterior neck area.  He was seen in the clinic and it was assessed that his strep test may be a false negative.  As result, he was given a prescription for penicillin to take if not improving.  He did start the antibiotic.  He follows up but today stating that since that visit his cough has worsened.  He has developed a harsh cough and has had coughing jags.  At times he can feel short of breath.  He does have a history of pneumonia which was caused some anxiety.  He missed work today because of his discomfort.  He has no known history of asthma.  He has had some ear discomfort.  He denies ongoing fever.    The following portions of the patient's history were reviewed and updated as appropriate: allergies, current medications,  "past family history, past medical history, past social history, past surgical history and problem list.    Review of Systems   A 12 point comprehensive review of systems was negative except as noted.      Current Outpatient Prescriptions   Medication Sig Dispense Refill     aspirin 81 MG EC tablet Take 81 mg by mouth daily.       atorvastatin (LIPITOR) 20 MG tablet Take 1 tablet (20 mg total) by mouth daily. 90 tablet 3     penicillin VK (PEN VK) 500 MG tablet Take 1 tablet (500 mg total) by mouth 2 (two) times a day for 10 days. 20 tablet 0     benzonatate (TESSALON) 200 MG capsule Take 1 capsule (200 mg total) by mouth 3 (three) times a day as needed for cough. 20 capsule 0     No current facility-administered medications for this visit.        Objective:      /72  Pulse 61  Temp 98.4  F (36.9  C) (Oral)   Resp 16  Ht 5' 4.5\" (1.638 m)  Wt 145 lb (65.8 kg)  SpO2 99%  BMI 24.5 kg/m2      General appearance: alert, appears stated age and cooperative  Head: Normocephalic, without obvious abnormality, atraumatic  Eyes: conjunctivae/corneas clear. PERRL, EOM's intact.   Ears: normal TM's and external ear canals both ears  Nose: Nares normal. Septum midline. Mucosa normal. No drainage or sinus tenderness.  Throat: Oropharynx mildly erythematous without exudate  Neck: no adenopathy, supple, symmetrical, trachea midline   Back: symmetric, no curvature. ROM normal. No CVA tenderness.  Lungs: clear to auscultation bilaterally  Heart: regular rate and rhythm, S1, S2 normal, no murmur, click, rub or gallop  Abdomen: soft, non-tender  Extremities: extremities normal, atraumatic, no cyanosis or edema  Skin: Skin color, texture, turgor normal. No rashes or lesions  Lymph nodes: Cervical nodes normal.  Neurologic: Alert and oriented X 3. Normal coordination and gait         Recent Results (from the past 168 hour(s))   Rapid Strep A Screen-Throat   Result Value Ref Range    Rapid Strep A Antigen No Group A Strep " detected, presumptive negative No Group A Strep detected, presumptive negative   Group A Strep, RNA Direct Detection, Throat   Result Value Ref Range    Group A Strep by PCR No Group A Strep rRNA detected No Group A Strep rRNA detected   Influenza A/B Rapid Test   Result Value Ref Range    Influenza  A, Rapid Antigen No Influenza A antigen detected No Influenza A antigen detected    Influenza B, Rapid Antigen No Influenza B antigen detected No Influenza B antigen detected          This note has been dictated using voice recognition software. Any grammatical or context distortions are unintentional and inherent to the software

## 2021-06-19 NOTE — LETTER
Letter by Johnie Faulkner MD at      Author: Johnie Faulkner MD Service: -- Author Type: --    Filed:  Encounter Date: 11/12/2019 Status: Signed         Cecilio Redding MD  [id number]  [address] [address]                                  November 12, 2019    Patient: James Luis   MR Number: [id number]   YOB: 1970   Date of Visit: 11/12/2019     Dear Dr. Vahid MD:    Thank you for referring James Luis to me for evaluation. Below are the relevant portions of my assessment and plan of care.    If you have questions, please do not hesitate to call me. I look forward to following James along with you.    Sincerely,        Johnie Faulkner MD          CC  No Recipients  Johnie Faulkner MD  11/12/2019  8:28 AM  Sign when Signing Visit  Assessment and Plan:James Luis is a 49 y.o.  Mwith a past medical history significant for recurrent pneumonia who presents to clinic today for a new cough.  He inhaled a face of saw dust and has been coughing since, although gradually getting worse.  I suspect this particulate inhalation has caused a bronchospastic episode and he might improve a little faster with albuterol.  I would not embark on any further workup or other treatments as he seems to be recovering without intervention from a clear provocation.    1) Cough - wood dust induced bronchospasm - particulate induced coughs could take a month to resolve as the body slowly clears the particulates from his lungs, like a smoker.  As needed albuterol, continue exercise as possible to improve mucociliary clearance.  2) RTC prn.           CCx: cough    HPI: Mr. Luis is a 49 year old male who returns on request of his wife to discuss a new cough.  A couple of weeks ago he was working in his wood shop and aerosolized a bunch of sawdust that he then accidentally inhaled.  He started coughing immediately and has been coughing since.  He is not producing mucous, and his  cough is slowly getting better on its own.  He is not short of breath and still doing his daily runs in either his gym or outside.  He has no fevers.  The cough happens through the day, but he says it is distinctly different than when he caught pneumonia in the past.    ROS:  Review of Systems - History obtained from the patient  General ROS: negative  Psychological ROS: negative  ENT ROS: negative  Allergy and Immunology ROS: negative  Endocrine ROS: negative  Respiratory ROS: positive for - cough  negative for - hemoptysis, orthopnea, pleuritic pain, shortness of breath, sputum changes, stridor, tachypnea or wheezing  Cardiovascular ROS: no chest pain or palpitations  Gastrointestinal ROS: no abdominal pain, change in bowel habits, or black or bloody stools  Genito-Urinary ROS: no dysuria, trouble voiding, or hematuria  Musculoskeletal ROS: negative  Neurological ROS: no TIA or stroke symptoms  Dermatological ROS: negative      Current Meds:  Current Outpatient Medications   Medication Sig   ? aspirin 81 MG EC tablet Take 81 mg by mouth daily.   ? atorvastatin (LIPITOR) 20 MG tablet TAKE 1 TABLET (20 MG TOTAL) BY MOUTH DAILY.   ? cyclobenzaprine (FLEXERIL) 10 MG tablet Take 0.5 tablets (5 mg total) by mouth every 8 (eight) hours as needed for muscle spasms.   ? albuterol sulfate 90 mcg/actuation AePB Inhale 180 mcg every 6 (six) hours as needed.       Labs:  No results found for this or any previous visit (from the past 72 hour(s)).    I have personally reviewed all pertinent imaging studies and PFT results unless otherwise noted.    Imaging studies:  Ct Chest With Contrast    Result Date: 9/10/2018  CT CHEST W CONTRAST 9/10/2018 11:52 AM INDICATION: Shortness of breath, follow-up recommend from previous ct. Bilateral infiltrates.. TECHNIQUE: Routine chest. Dose reduction techniques were used. IV CONTRAST: omni 350 90ml COMPARISON: CT 04/09/2018 FINDINGS: LUNGS AND PLEURA: Resolution of the mild bilateral patchy  "pulmonary infiltrates since the prior study consistent with resolved infection. No new findings. MEDIASTINUM: Negative. No lymphadenopathy. LIMITED UPPER ABDOMEN: Negative. MUSCULOSKELETAL: Negative.     CONCLUSION: 1.  Resolved patchy bilateral pulmonary infiltrates consistent with resolved infection. 2.  No remaining or new findings.        Physical Exam:  /60   Pulse (!) 55   Resp 12   Ht 5' 4.5\" (1.638 m)   Wt 152 lb (68.9 kg)   SpO2 100%   BMI 25.69 kg/m     General - Well nourished  Ears/Mouth -  OP pink moist, no thrush  Neck - no cervical lymphadenopathy  Lungs - Clear to ausculation bilaterally   CVS - regular rhythm with no murmurs, rubs or gallups  Abdomen - soft, NT, ND, NABS  Ext - no cyanosis, clubbing or edema  Skin - no rash  Psychology - alert and oriented, answers appropriate        Electronically signed by:    Johnie Faulkner MD PhD  [facility]       "

## 2021-06-19 NOTE — LETTER
Letter by Johnie Faulkner MD at      Author: Johnie Faulkner MD Service: -- Author Type: --    Filed:  Encounter Date: 6/21/2019 Status: (Other)         James Luis  [address] [place] [address]    June 21, 2019    Dear Mr. Luis,    Welcome to [facility]! Your appointment information is below.   Please bring the following to your appointment:    Insurance Card, so we may scan it for our records    Drivers license or valid ID, so we may scan it for our records    Co-pay (as applicable per your insurance plan)    A current list of your medications including over the counter products such as vitamins and supplements    Your medical records including copies of X-Ray films if you are transferring your care from another clinic.  If you do not have your records, please fill out the release of information form and we will request those records.     Provider: Johnie Faulkner MD  Appointment Date:  Monday, August 19, 2019  Arrival Time:  8:00 am    Location: Critical access hospital         [address]        [facility], [address]    **Please allow adequate time for your commute and parking. If you are more than 10 minutes late, you may be asked to reschedule.     If you need to cancel or reschedule your appointment, please notify us at least 24 hours prior to your appointment time so we are able to make this time available for another patient.    Thank you for choosing the [facility] for your health care needs. If you have any questions, please do not hesitate to contact us at any time at   [phone]. We look forward to caring for you.     Sincerely,     [facility] [place] staff

## 2021-06-19 NOTE — LETTER
Letter by Johnie Faulkner MD at      Author: Johnie Faulkner MD Service: -- Author Type: --    Filed:  Encounter Date: 12/2/2019 Status: Signed         Cecilio Redding MD  [address]  [facility] [address]  [facility] [address]                                  December 2, 2019    Patient: James Luis   MR Number: [id number]   YOB: 1970   Date of Visit: 12/2/2019     Dear Dr. Vahid MD:    Thank you for referring James Luis to me for evaluation. Below are the relevant portions of my assessment and plan of care.    If you have questions, please do not hesitate to call me. I look forward to following James along with you.    Sincerely,        Johnie Faulkner MD            [facility]  Johnie Faulkner MD  12/2/2019  8:44 AM  Sign when Signing Visit  Assessment and Plan:James Luis is a 49 y.o. M with a past medical history significant for recurrent pneumonias who presents to clinic today for bronchitis.  He has been to the urgent care twice for this current bought, and appears very susceptible to lung insults.  He may need a longer course of prednisone, but may also respond to the antinflammatory properties of azithromycin.  Codeine will help him get more sleep and numb his cough.  Finally, given the recurrent flares this winter, I would like to start him on Arnuity, an inhaled steroid, to try and reduce the number of exacerbations he is having.    1) Recurrent airways exacerbations - unclear if this is asthma or RADS.  Normal PFTs and immunoglobulin levels, could be related to the exposure of fumes in the [place] war, but impossible to prove.   Start Arnuity 100 daily inhaled, discussed side effects and encouraged to rinse mouth after.  Do this through the winter at the least to see if it can prevent recurrent flares  2) Current acute bronchitis - provide more prednisone, if his cough gets worse as he comes down on his taper, he needs to go back up to the prior level for  3 more days.  Continue albuterol as current.  Codeine AC guaf 5 ml at bedtime to help with cough prn.  Azithromycin 5 day course (zpak)  3) RTC in 3 months        CCx:cough    HPI: Mr. Luis is a 49 year old male who returns to discuss a  Bronchitis.  He developed a cough about a week after I saw him last, and the cough was very vigorous and takes his breath away with fatigue.  He went to urgent care, picked up a 5 day course of prednisone, and improved.  HOwever the day after he stopped the prednisone, he started coughing again, and ended back in urgent care.  He was given a higher dose and a taper to follow.  He is on prednisone 40mg again, and is coughing some, but doesn't feel as good as he did when he was on 60 mg daily.  He isn't sleeping well, and he notices if he sits or lays flat, his cough is worse.  He has no fever or chills.  He is producing a brownish sputum.  He is using albuterol twice a day to good effect via a nebulizer.    ROS:  Review of Systems - History obtained from the patient  General ROS: negative  Psychological ROS: negative  ENT ROS: negative  Allergy and Immunology ROS: negative  Endocrine ROS: negative  Respiratory ROS: positive for - cough, shortness of breath, sputum changes and wheezing  negative for - hemoptysis or orthopnea  Cardiovascular ROS: no chest pain or palpitations  Gastrointestinal ROS: no abdominal pain, change in bowel habits, or black or bloody stools  Genito-Urinary ROS: no dysuria, trouble voiding, or hematuria  Musculoskeletal ROS: negative  Neurological ROS: no TIA or stroke symptoms  Dermatological ROS: negative      Current Meds:  Current Outpatient Medications   Medication Sig Note   ? albuterol (PROVENTIL) 2.5 mg /3 mL (0.083 %) nebulizer solution Take 3 mL (2.5 mg total) by nebulization every 6 (six) hours as needed for wheezing or shortness of breath.    ? albuterol sulfate 90 mcg/actuation AePB Inhale 180 mcg every 6 (six) hours as needed.    ? aspirin 81 MG  EC tablet Take 81 mg by mouth daily.    ? atorvastatin (LIPITOR) 20 MG tablet TAKE 1 TABLET (20 MG TOTAL) BY MOUTH DAILY.    ? cyclobenzaprine (FLEXERIL) 10 MG tablet Take 0.5 tablets (5 mg total) by mouth every 8 (eight) hours as needed for muscle spasms.    ? INNOSPIRE ELEGANCE Lyly     ? predniSONE (DELTASONE) 20 MG tablet Take 60mg by mouth daily for 3 days, then 40mg x 3 days, then 20mg x 3 days ,then 10mg x 3 days then stop. 12/2/2019: He is currently at 40mg   ? azithromycin (ZITHROMAX) 250 MG tablet Take 1 tablet (250 mg total) by mouth daily for 5 days. Take 500 mg (2 x 250 mg tablets) on day 1 followed by 250 mg (1 tablet) on days 2-5.    ? codeine-guaiFENesin (GUAIFENESIN AC)  mg/5 mL liquid Take 5 mL by mouth 3 (three) times a day as needed for cough.    ? fluticasone furoate (ARNUITY ELLIPTA) 100 mcg/actuation DsDv Inhale 100 mcg daily.    ? predniSONE (DELTASONE) 20 MG tablet Take 20 mg by mouth daily for 14 doses.        Labs:  No results found for this or any previous visit (from the past 72 hour(s)).    I have personally reviewed all pertinent imaging studies and PFT results unless otherwise noted.    Imaging studies:  Ct Chest With Contrast    Result Date: 9/10/2018  CT CHEST W CONTRAST 9/10/2018 11:52 AM INDICATION: Shortness of breath, follow-up recommend from previous ct. Bilateral infiltrates.. TECHNIQUE: Routine chest. Dose reduction techniques were used. IV CONTRAST: omni 350 90ml COMPARISON: CT 04/09/2018 FINDINGS: LUNGS AND PLEURA: Resolution of the mild bilateral patchy pulmonary infiltrates since the prior study consistent with resolved infection. No new findings. MEDIASTINUM: Negative. No lymphadenopathy. LIMITED UPPER ABDOMEN: Negative. MUSCULOSKELETAL: Negative.     CONCLUSION: 1.  Resolved patchy bilateral pulmonary infiltrates consistent with resolved infection. 2.  No remaining or new findings.        Physical Exam:  /82   Pulse 64   Resp 20   Wt 148 lb 11.2 oz (67.4  kg)   SpO2 95% Comment: RA  BMI 25.13 kg/m     General - Well nourished  Ears/Mouth -  OP pink moist, no thrush  Neck - no cervical lymphadenopathy  Lungs - scattered wheezes with slight rhonchi, clears with coughing  CVS - regular rhythm with no murmurs, rubs or gallups  Abdomen - soft, NT, ND, NABS  Ext - no cyanosis, clubbing or edema  Skin - no rash  Psychology - alert and oriented, answers appropriate        Electronically signed by:    Johnie Faulkner MD PhD  [facility]

## 2021-06-19 NOTE — LETTER
Letter by Johnie Faulkner MD at      Author: Johnie Faulkner MD Service: -- Author Type: --    Filed:  Encounter Date: 8/19/2019 Status: (Other)         Cecilio Redding MD  [address]  [facility] [address]  [facility] [address]                                  August 19, 2019    Patient: James Luis   MR Number: [id number]   YOB: 1970   Date of Visit: 8/19/2019     Dear Dr. Vahid MD:    Thank you for referring James Luis to me for evaluation. Below are the relevant portions of my assessment and plan of care.    If you have questions, please do not hesitate to call me. I look forward to following James along with you.    Sincerely,        Johnie Faulkner MD          CC  No Recipients  Johnie Faulkner MD  8/19/2019  8:47 AM  Sign at close encounter  Assessment and Plan:James Luis is a 48 y.o. M with a past medical history significant for recurrent pneumonia who presents to clinic today for evaluation of his lung health. He has had about 4 pneumonias over the last 12 years, which is a little unusual.  He did have toxic exposures while working in the  as a [employer] , however I'm not certain those could be responsible for this type of lung problem.  He has a normal CT at a baseline, and the one pneumonia I have an image of was bilateral, upper lobe patchy infiltrates.  I think it is reasonable to check his immunoglobulin levels, and also a pulmonary function test.  If both of those are normal, we can likely stop his workup with a good degree of certainty that he doesn't have any serious risk.  If he does develop another pneumonia, we could consider going straight to bronchoscopy to determine the cause.  Another test to consider would be to immunize against streptococcus, and then measure his antibody response with the specific panel to that.  A referral to Dr. Sage, an immunologist, may be appropriate for that workup.    1. Recurrent pneumonias - No  baseline symptoms, and a normal baseline CT.  Check PFTs and measure IgA, IgM and IgG total levels to ensure no deficiencies.    2. RTC in 2-3 months to discuss the results      CCx:  pneumonia     HPI: Mr. Luis is a 48 year old male referred by Dr. Redding to discuss a history of recurrent pneumonias.  He describes having a pneumonia 4 times in the last 12 years or so.  His last pneuomonia was in the Spring of 2018.  He had a hacking cough and chills that got better with a Z pack.  He thinks the first pneumonia was the worst.  He is a  and former [employer] , and wants to know if his exposures while in the  could be to blame for this.  He has no baseline cough, or shortness of breath.  He ran three miles this morning and has no physical limitations in his current line of work.  He is a former smoker but quit 19 years ago.  He also quit drinking over 20 years ago.  He has no significant medical problems.  He has occasional heart burn, and some seasonal allergies.  He does not get frequent infections like sinus infections or ear infections.  He has no family history of recurrent infections or lung disease.    PMH:  Past Medical History:   Diagnosis Date   ? Closed Fracture Of The Left Cuboid Bone     Created by Conversion        PSH:  No past surgical history on file.    SH:  Social History     Socioeconomic History   ? Marital status:      Spouse name: Not on file   ? Number of children: Not on file   ? Years of education: Not on file   ? Highest education level: Not on file   Occupational History   ? Not on file   Social Needs   ? Financial resource strain: Not on file   ? Food insecurity:     Worry: Not on file     Inability: Not on file   ? Transportation needs:     Medical: Not on file     Non-medical: Not on file   Tobacco Use   ? Smoking status: Former Smoker   ? Smokeless tobacco: Former User   Substance and Sexual Activity   ? Alcohol use: Not on file   ? Drug use: Not  on file   ? Sexual activity: Not on file   Lifestyle   ? Physical activity:     Days per week: Not on file     Minutes per session: Not on file   ? Stress: Not on file   Relationships   ? Social connections:     Talks on phone: Not on file     Gets together: Not on file     Attends Worship service: Not on file     Active member of club or organization: Not on file     Attends meetings of clubs or organizations: Not on file     Relationship status: Not on file   ? Intimate partner violence:     Fear of current or ex partner: Not on file     Emotionally abused: Not on file     Physically abused: Not on file     Forced sexual activity: Not on file   Other Topics Concern   ? Not on file   Social History Narrative   ? Not on file       Family history:  Family History   Problem Relation Age of Onset   ? Hyperlipidemia Mother    ? Acute Myocardial Infarction Father    ? Hypertension Father    ? Parkinsonism Father    ? Hyperlipidemia Brother    ? Alzheimer's disease Maternal Grandmother    ? Heart disease Maternal Grandfather    ? Acute Myocardial Infarction Maternal Grandfather    ? Heart disease Paternal Grandfather    ? Acute Myocardial Infarction Paternal Grandfather    ? Heart disease Brother    ? Acute Myocardial Infarction Brother      The family history was reviewed and is not pertinent to the chief complaint or HPI.    ROS:  Review of Systems - History obtained from the patient  General ROS: negative  Psychological ROS: negative  ENT ROS: negative  Allergy and Immunology ROS: negative  Endocrine ROS: negative  Respiratory ROS:  negative for - cough, hemoptysis, orthopnea, pleuritic pain, shortness of breath, sputum changes, stridor, tachypnea or wheezing  Cardiovascular ROS: no chest pain or palpitations  Gastrointestinal ROS: no abdominal pain, change in bowel habits, or black or bloody stools  Genito-Urinary ROS: no dysuria, trouble voiding, or hematuria  Musculoskeletal ROS: negative  Neurological ROS: no TIA  "or stroke symptoms  Dermatological ROS: negative      Current Meds:  Current Outpatient Medications   Medication Sig   ? aspirin 81 MG EC tablet Take 81 mg by mouth daily.   ? atorvastatin (LIPITOR) 20 MG tablet TAKE 1 TABLET (20 MG TOTAL) BY MOUTH DAILY.   ? cyclobenzaprine (FLEXERIL) 10 MG tablet Take 0.5 tablets (5 mg total) by mouth every 8 (eight) hours as needed for muscle spasms.       Labs:  No results found for this or any previous visit (from the past 72 hour(s)).    I have personally reviewed all imaging and PFT data available pertinent to this visit.    Imaging studies:  Ct Chest With Contrast    Result Date: 9/10/2018  CT CHEST W CONTRAST 9/10/2018 11:52 AM INDICATION: Shortness of breath, follow-up recommend from previous ct. Bilateral infiltrates.. TECHNIQUE: Routine chest. Dose reduction techniques were used. IV CONTRAST: omni 350 90ml COMPARISON: CT 04/09/2018 FINDINGS: LUNGS AND PLEURA: Resolution of the mild bilateral patchy pulmonary infiltrates since the prior study consistent with resolved infection. No new findings. MEDIASTINUM: Negative. No lymphadenopathy. LIMITED UPPER ABDOMEN: Negative. MUSCULOSKELETAL: Negative.     CONCLUSION: 1.  Resolved patchy bilateral pulmonary infiltrates consistent with resolved infection. 2.  No remaining or new findings.      PFTs:    none      Physical Exam:  /66   Pulse 60   Resp 24   Ht 5' 4.5\" (1.638 m)   Wt 148 lb 9.6 oz (67.4 kg)   SpO2 96% Comment: RA  BMI 25.11 kg/m     General - Well nourished  Ears/Mouth - OP pink moist, no thrush  Neck - no cervical lymphadenopathy  Lungs - Clear to ausculation bilaterally  CVS - regular rhythm with no murmurs, rubs or gallups  Abdomen - soft, NT, ND, NABS  Ext - no cyanosis, clubbing or edema  Skin - no rash  Psychology - alert and oriented, answers appropriate        Electronically signed by:    Johnie Faulkner MD PhD  [facility]       "

## 2021-06-24 NOTE — PROGRESS NOTES
On license of [facility] Clinic Note    James Luis  1970   [id number]    James Luis is a 48 y.o. male presenting to discuss the following:     CC:   Chief Complaint   Patient presents with     Back Pain     Fasting labs     Hip Pain       HPI:  BACK PAIN/HIP PAIN -   Was doing physical training at work, felt like had a pop, went home, OTC management and more Physical Training, tweaked again. Pain not worsening, but not improving either.  On front of hip and radiating down to toes, is having sharp pain. Wondering if over-compensating? Not doing formal physical therapy. On Sunday, started ibuprofen up until this morning. Using heating pad as well. Seems helpful.     Onset with sliding sand bag. Pain is located midline by beltline, not on sides. Pain is constant. Hard to bend over and stretch, limited by pain. Not radiating down legs/no sciatica. Wouldn't get worse with twisting or hyperextension. When sitting all day, harder to get up from pain.     HERE FOR CHOLESTEROL CHECK -   Fasting  No stomach upset or GI bleeding with aspirin. No myalgias associated with statin. Family history of heart disease.     The 10-year ASCVD risk score (Nikki MARLO Jr., et al., 2013) is: 2.1%    Values used to calculate the score:      Age: 48 years      Sex: Male      Is Non- : No      Diabetic: No      Tobacco smoker: No      Systolic Blood Pressure: 110 mmHg      Is BP treated: No      HDL Cholesterol: 41 mg/dL      Total Cholesterol: 147 mg/dL     ROS:   MSK: see above.   NEURO: Has pain front of leg all the way down to toes. Not necessarily pain, but feels like string is pulling tight and toes going numb. No bowel or bladder incontinence.     PMH:   Patient Active Problem List   Diagnosis     Hypercholesteremia     Closed Fracture Of The Left Cuboid Bone       No past medical history on file.    PSH:   No past surgical history on file.      MEDICATIONS:   Current Outpatient Medications on File Prior to Visit    Medication Sig Dispense Refill     aspirin 81 MG EC tablet Take 81 mg by mouth daily.       atorvastatin (LIPITOR) 20 MG tablet Take 1 tablet (20 mg total) by mouth daily. 90 tablet 2     albuterol (PROAIR HFA;PROVENTIL HFA;VENTOLIN HFA) 90 mcg/actuation inhaler Inhale 2 puffs every 6 (six) hours as needed for wheezing. 1 each 0     azithromycin (ZITHROMAX Z-GIL) 250 MG tablet Take 2 tablets (500 mg) on  Day 1,  followed by 1 tablet (250 mg) once daily on Days 2 through 5. 6 tablet 0     benzonatate (TESSALON) 200 MG capsule Take 1 capsule (200 mg total) by mouth 3 (three) times a day as needed for cough. 20 capsule 0     predniSONE (DELTASONE) 20 MG tablet Take one PO BID x 5 days 10 tablet 0     No current facility-administered medications on file prior to visit.        ALLERGIES:  No Known Allergies    PHYSICAL EXAM:   /74   Pulse (!) 52   Temp 97.9  F (36.6  C) (Oral)   Resp 12   Wt 152 lb (68.9 kg)   BMI 25.69 kg/m     GENERAL: James is a pleasant, well appearing male, in no acute distress. Appears stated age.   HEART: Regular rate and rhythm, no murmurs.  LUNGS: Clear to auscultation bilaterally, unlabored.   MSK: No pain to palpation of posterior cervical, thoracic, lumbar, or sacral spine. No significant tenderness to palpation of lumbar paraspinal musculature or tissue texture changes. Negative straight leg raise bilaterally.   NEURO: Sensation intact bilateral lower extremities. Strength 5/5 bilateral L2-S1 distribution. Patellar reflexes +1/4 bilaterally.   PSYCH: Mood is good, normal affect.     ASSESSMENT & PLAN:   James Luis is a 48 y.o. male presenting today for evaluation of acute onset midline low back pain without sciatica or focal neurological deficits that started after strain at work during physical training.    1. Acute midline low back pain without sciatica  - cyclobenzaprine (FLEXERIL) 10 MG tablet; Take 0.5 tablets (5 mg total) by mouth every 8 (eight) hours as needed for  muscle spasms.  Dispense: 30 tablet; Refill: 1  - Ambulatory referral to Adult PT- Internal    No acute focal neurological deficits warranting urgent imaging.     Recommended scheduling NSAIDS and Tylenol. Trial topicals such as biofreeze or icy hot. May apply warm packs and/or sit in warm bath. Avoid triggering activities, but do not recommend strict bed rest.     Will add muscle relaxer. If symptoms not improving within the next week, recommend initiation of physical therapy.     Will defer imaging unless symptoms are not resolving in the next 6-8 weeks.     2. Hypercholesteremia  3. Medication management  - Lipid [place] FASTING    James is taking atorvastatin, states he is due for annual lipid screening. Is fasting today.    4. Screen for colon cancer  - Ambulatory referral for Colonoscopy    RTC: 6-8 weeks if back pain not resolved    Elisha Carmen, DO

## 2021-06-24 NOTE — PROGRESS NOTES
Lipid panel looks great. Numbers at goal. ASCVD risk is low at 1.9%. Will continue with statin therapy. Patient notified by [employer].     The 10-year ASCVD risk score (Nikki SELLERS Jr., et al., 2013) is: 1.9%    Values used to calculate the score:      Age: 48 years      Sex: Male      Is Non- : No      Diabetic: No      Tobacco smoker: No      Systolic Blood Pressure: 110 mmHg      Is BP treated: No      HDL Cholesterol: 44 mg/dL      Total Cholesterol: 141 mg/dL

## 2021-06-24 NOTE — PATIENT INSTRUCTIONS - HE
1. Acute midline low back pain without sciatica  Ibuprofen: 600mg every 6 hours as needed (take with food) / alternative Aleve (Naproxen) 500 mg twice daily as needed  Tylenol: 1000mg every 8 hours as needed  Continue with heating pad/warm baths  Topicals like Icy Hot or BioFreeze can be helpful  - cyclobenzaprine (FLEXERIL) 10 MG tablet; Take 0.5 tablets (5 mg total) by mouth every 8 (eight) hours as needed for muscle spasms.  Dispense: 30 tablet; Refill: 1 (Can take up to full tablet, start with half). Take at home to see how this effects you. Don't drive if any concerns for sedation.   - Ambulatory referral to Adult PT- Internal --> hold off unless not improving by next week.     Hold off on imaging unless not improving with 6-8 weeks of physical therapy.     2. Hypercholesteremia  3. Medication management  - Lipid [place] FASTING    4. Screen for colon cancer  - Ambulatory referral for Colonoscopy

## 2021-06-25 NOTE — PROGRESS NOTES
[facility] Clinic Note    James Luis  1970   [id number]    James Luis is a 48 y.o. male presenting to discuss the following:     CC:   Chief Complaint   Patient presents with     Hand Pain     finger infection       HPI:  James presents with pain and swelling of right distal pointer finger.  Symptoms started 2 days ago.  Started with pain nail on the dorsal aspect of his hand.  Tried to poke it with a needle but did not have any drainage.  Is now having pain and tenderness in the palmar surface/finger pad of his right pointer finger.  Tender to the touch.  Not actively draining.  He had difficulty sleeping last night due to the pain and throbbing.  Had to elevate his hand.    Back pain improved with Flexeril.    ROS:   CONSTITUTIONAL: Denies any fevers or chills.    PMH:   Patient Active Problem List   Diagnosis     Hypercholesteremia     Rosacea       Past Medical History:   Diagnosis Date     Closed Fracture Of The Left Cuboid Bone     Created by Conversion        PSH:   No past surgical history on file.      MEDICATIONS:   Current Outpatient Medications on File Prior to Visit   Medication Sig Dispense Refill     aspirin 81 MG EC tablet Take 81 mg by mouth daily.       atorvastatin (LIPITOR) 20 MG tablet Take 1 tablet (20 mg total) by mouth daily. 90 tablet 2     cyclobenzaprine (FLEXERIL) 10 MG tablet Take 0.5 tablets (5 mg total) by mouth every 8 (eight) hours as needed for muscle spasms. 30 tablet 1     No current facility-administered medications on file prior to visit.        ALLERGIES:  No Known Allergies    PHYSICAL EXAM:   /82   Pulse (!) 56   Temp 98.6  F (37  C) (Oral)   Resp 12   Wt 151 lb (68.5 kg)   BMI 25.52 kg/m     GENERAL: James is a pleasant, well-appearing male, no acute distress.  HEART: Regular rate and rhythm, no murmurs.  LUNGS: Clear to auscultation bilaterally, unlabored.  DERM: Distal right pointer finger mildly erythematous, swollen, and tender to the touch.  No  fluctuance present.    ASSESSMENT & PLAN:   James Luis is a 48 y.o. male presenting today for evaluation of right finger pain.     1. Felon of finger of right hand  - amoxicillin-clavulanate (AUGMENTIN) 875-125 mg per tablet; Take 1 tablet by mouth 2 (two) times a day for 10 days.  Dispense: 20 tablet; Refill: 0  - Ambulatory referral to Orthopedic Surgery     James is presenting early in the course of a felon infection (infection of finger pulp).  Recommended initiation of antibiotics and frequent warm soaks.  If symptoms are worsening, he may may need referral to orthopedics hand surgery for incision and drainage.    Provided with referral for orthopedics.  If symptoms are stable, he may proceed with orthopedic appointment.  If symptoms are worsening, discussed he needs to just go into the Orth O quick clinic.  If symptoms improving with conservative management, he can cancel the orthopedic appointment.    RTC: August 2019 - annual physical exam     Elisha Carmen DO

## 2021-06-27 NOTE — PROGRESS NOTES
"Progress Notes by Lorrie Escalera AuD at 6/20/2019  7:30 AM     Author: Lorrie Escalrea AuD Service: -- Author Type: Audiologist    Filed: 6/20/2019  8:26 AM Encounter Date: 6/20/2019 Status: Signed    : Lorrie Escalera AuD (Audiologist)       Audiology Report:    Referring Provider: Dr. Redding    Summary: Audiology visit completed. Please see audiogram below or in \"media\" tab for case history and results.   James Luis is seen today for comprehensive hearing evaluation.     Results:   Transducer: Circumaural headphones    Reliability was good  and there was good  SRT to PTA agreement.       Plan:  Results are discussed in detail.  He should return for retesting in 1-2 years, or sooner with concerns.  The patient is counseled on listening strategies. The patient is counseled on tinnitus. The patient is counseled on hearing protection.    Ulises Faria, CCC-A  [place] Licensed Audiologist #[id number]                 "

## 2021-10-02 ENCOUNTER — HEALTH MAINTENANCE LETTER (OUTPATIENT)
Age: 51
End: 2021-10-02

## 2022-05-08 ENCOUNTER — HEALTH MAINTENANCE LETTER (OUTPATIENT)
Age: 52
End: 2022-05-08

## 2023-01-14 ENCOUNTER — HEALTH MAINTENANCE LETTER (OUTPATIENT)
Age: 53
End: 2023-01-14

## 2024-02-11 ENCOUNTER — HEALTH MAINTENANCE LETTER (OUTPATIENT)
Age: 54
End: 2024-02-11

## 2024-10-10 NOTE — ED AVS SNAPSHOT
[facility] Emergency Department  [address] [address]  Phone:  [phone]  Fax:  [phone]                                    James Luis   MRN: [id number]    Department:  [facility] Emergency Department   Date of Visit:  11/23/2019           After Visit Summary Signature Page    I have received my discharge instructions, and my questions have been answered. I have discussed any challenges I see with this plan with the nurse or doctor.    ..........................................................................................................................................  Patient/Patient Representative Signature      ..........................................................................................................................................  Patient Representative Print Name and Relationship to Patient    ..................................................               ................................................  Date                                   Time    ..........................................................................................................................................  Reviewed by Signature/Title    ...................................................              ..............................................  Date                                               Time          22EPIC Rev 08/18       
none